# Patient Record
Sex: FEMALE | Race: OTHER | HISPANIC OR LATINO | URBAN - METROPOLITAN AREA
[De-identification: names, ages, dates, MRNs, and addresses within clinical notes are randomized per-mention and may not be internally consistent; named-entity substitution may affect disease eponyms.]

---

## 2021-12-12 ENCOUNTER — EMERGENCY (EMERGENCY)
Facility: HOSPITAL | Age: 26
LOS: 1 days | Discharge: HOME | End: 2021-12-12
Attending: EMERGENCY MEDICINE
Payer: COMMERCIAL

## 2021-12-12 VITALS
RESPIRATION RATE: 17 BRPM | DIASTOLIC BLOOD PRESSURE: 79 MMHG | HEIGHT: 57 IN | SYSTOLIC BLOOD PRESSURE: 119 MMHG | TEMPERATURE: 99 F | OXYGEN SATURATION: 98 % | WEIGHT: 125 LBS | HEART RATE: 74 BPM

## 2021-12-12 VITALS
HEART RATE: 76 BPM | TEMPERATURE: 98 F | DIASTOLIC BLOOD PRESSURE: 72 MMHG | SYSTOLIC BLOOD PRESSURE: 121 MMHG | OXYGEN SATURATION: 100 % | RESPIRATION RATE: 18 BRPM

## 2021-12-12 LAB
ALBUMIN SERPL ELPH-MCNC: 3.7 G/DL — SIGNIFICANT CHANGE UP (ref 3.5–5)
ALP SERPL-CCNC: 93 U/L — SIGNIFICANT CHANGE UP (ref 40–120)
ALT FLD-CCNC: 37 U/L DA — SIGNIFICANT CHANGE UP (ref 10–60)
ANION GAP SERPL CALC-SCNC: 6 MMOL/L — SIGNIFICANT CHANGE UP (ref 5–17)
AST SERPL-CCNC: 18 U/L — SIGNIFICANT CHANGE UP (ref 10–40)
BASOPHILS # BLD AUTO: 0.03 K/UL — SIGNIFICANT CHANGE UP (ref 0–0.2)
BASOPHILS NFR BLD AUTO: 0.4 % — SIGNIFICANT CHANGE UP (ref 0–2)
BILIRUB SERPL-MCNC: 0.4 MG/DL — SIGNIFICANT CHANGE UP (ref 0.2–1.2)
BUN SERPL-MCNC: 11 MG/DL — SIGNIFICANT CHANGE UP (ref 7–18)
CALCIUM SERPL-MCNC: 8.8 MG/DL — SIGNIFICANT CHANGE UP (ref 8.4–10.5)
CHLORIDE SERPL-SCNC: 111 MMOL/L — HIGH (ref 96–108)
CK SERPL-CCNC: 107 U/L — SIGNIFICANT CHANGE UP (ref 21–215)
CO2 SERPL-SCNC: 23 MMOL/L — SIGNIFICANT CHANGE UP (ref 22–31)
CREAT SERPL-MCNC: 0.56 MG/DL — SIGNIFICANT CHANGE UP (ref 0.5–1.3)
EOSINOPHIL # BLD AUTO: 0.04 K/UL — SIGNIFICANT CHANGE UP (ref 0–0.5)
EOSINOPHIL NFR BLD AUTO: 0.5 % — SIGNIFICANT CHANGE UP (ref 0–6)
GLUCOSE SERPL-MCNC: 96 MG/DL — SIGNIFICANT CHANGE UP (ref 70–99)
HCG SERPL-ACNC: <1 MIU/ML — SIGNIFICANT CHANGE UP
HCT VFR BLD CALC: 41.4 % — SIGNIFICANT CHANGE UP (ref 34.5–45)
HGB BLD-MCNC: 14 G/DL — SIGNIFICANT CHANGE UP (ref 11.5–15.5)
IMM GRANULOCYTES NFR BLD AUTO: 0.4 % — SIGNIFICANT CHANGE UP (ref 0–1.5)
LYMPHOCYTES # BLD AUTO: 2.21 K/UL — SIGNIFICANT CHANGE UP (ref 1–3.3)
LYMPHOCYTES # BLD AUTO: 27.3 % — SIGNIFICANT CHANGE UP (ref 13–44)
MCHC RBC-ENTMCNC: 28.9 PG — SIGNIFICANT CHANGE UP (ref 27–34)
MCHC RBC-ENTMCNC: 33.8 GM/DL — SIGNIFICANT CHANGE UP (ref 32–36)
MCV RBC AUTO: 85.5 FL — SIGNIFICANT CHANGE UP (ref 80–100)
MONOCYTES # BLD AUTO: 0.65 K/UL — SIGNIFICANT CHANGE UP (ref 0–0.9)
MONOCYTES NFR BLD AUTO: 8 % — SIGNIFICANT CHANGE UP (ref 2–14)
NEUTROPHILS # BLD AUTO: 5.15 K/UL — SIGNIFICANT CHANGE UP (ref 1.8–7.4)
NEUTROPHILS NFR BLD AUTO: 63.4 % — SIGNIFICANT CHANGE UP (ref 43–77)
NRBC # BLD: 0 /100 WBCS — SIGNIFICANT CHANGE UP (ref 0–0)
PLATELET # BLD AUTO: 247 K/UL — SIGNIFICANT CHANGE UP (ref 150–400)
POTASSIUM SERPL-MCNC: 4.3 MMOL/L — SIGNIFICANT CHANGE UP (ref 3.5–5.3)
POTASSIUM SERPL-SCNC: 4.3 MMOL/L — SIGNIFICANT CHANGE UP (ref 3.5–5.3)
PROT SERPL-MCNC: 7.4 G/DL — SIGNIFICANT CHANGE UP (ref 6–8.3)
RBC # BLD: 4.84 M/UL — SIGNIFICANT CHANGE UP (ref 3.8–5.2)
RBC # FLD: 13.1 % — SIGNIFICANT CHANGE UP (ref 10.3–14.5)
SODIUM SERPL-SCNC: 140 MMOL/L — SIGNIFICANT CHANGE UP (ref 135–145)
TROPONIN I, HIGH SENSITIVITY RESULT: 3.8 NG/L — SIGNIFICANT CHANGE UP
WBC # BLD: 8.11 K/UL — SIGNIFICANT CHANGE UP (ref 3.8–10.5)
WBC # FLD AUTO: 8.11 K/UL — SIGNIFICANT CHANGE UP (ref 3.8–10.5)

## 2021-12-12 PROCEDURE — 36415 COLL VENOUS BLD VENIPUNCTURE: CPT

## 2021-12-12 PROCEDURE — 99284 EMERGENCY DEPT VISIT MOD MDM: CPT | Mod: 25

## 2021-12-12 PROCEDURE — 84702 CHORIONIC GONADOTROPIN TEST: CPT

## 2021-12-12 PROCEDURE — 85025 COMPLETE CBC W/AUTO DIFF WBC: CPT

## 2021-12-12 PROCEDURE — 84484 ASSAY OF TROPONIN QUANT: CPT

## 2021-12-12 PROCEDURE — 80053 COMPREHEN METABOLIC PANEL: CPT

## 2021-12-12 PROCEDURE — 96361 HYDRATE IV INFUSION ADD-ON: CPT

## 2021-12-12 PROCEDURE — 99285 EMERGENCY DEPT VISIT HI MDM: CPT

## 2021-12-12 PROCEDURE — 93005 ELECTROCARDIOGRAM TRACING: CPT

## 2021-12-12 PROCEDURE — 82550 ASSAY OF CK (CPK): CPT

## 2021-12-12 PROCEDURE — 96374 THER/PROPH/DIAG INJ IV PUSH: CPT

## 2021-12-12 RX ORDER — SODIUM CHLORIDE 9 MG/ML
1000 INJECTION INTRAMUSCULAR; INTRAVENOUS; SUBCUTANEOUS ONCE
Refills: 0 | Status: COMPLETED | OUTPATIENT
Start: 2021-12-12 | End: 2021-12-12

## 2021-12-12 RX ORDER — IBUPROFEN 200 MG
1 TABLET ORAL
Qty: 30 | Refills: 0
Start: 2021-12-12

## 2021-12-12 RX ORDER — KETOROLAC TROMETHAMINE 30 MG/ML
30 SYRINGE (ML) INJECTION ONCE
Refills: 0 | Status: DISCONTINUED | OUTPATIENT
Start: 2021-12-12 | End: 2021-12-12

## 2021-12-12 RX ORDER — DIAZEPAM 5 MG
5 TABLET ORAL ONCE
Refills: 0 | Status: DISCONTINUED | OUTPATIENT
Start: 2021-12-12 | End: 2021-12-12

## 2021-12-12 RX ORDER — DIAZEPAM 5 MG
1 TABLET ORAL
Qty: 10 | Refills: 0
Start: 2021-12-12

## 2021-12-12 RX ORDER — OXYCODONE AND ACETAMINOPHEN 5; 325 MG/1; MG/1
1 TABLET ORAL ONCE
Refills: 0 | Status: DISCONTINUED | OUTPATIENT
Start: 2021-12-12 | End: 2021-12-12

## 2021-12-12 RX ADMIN — SODIUM CHLORIDE 1000 MILLILITER(S): 9 INJECTION INTRAMUSCULAR; INTRAVENOUS; SUBCUTANEOUS at 16:43

## 2021-12-12 RX ADMIN — Medication 30 MILLIGRAM(S): at 17:43

## 2021-12-12 RX ADMIN — OXYCODONE AND ACETAMINOPHEN 1 TABLET(S): 5; 325 TABLET ORAL at 19:05

## 2021-12-12 RX ADMIN — Medication 30 MILLIGRAM(S): at 16:43

## 2021-12-12 RX ADMIN — SODIUM CHLORIDE 1000 MILLILITER(S): 9 INJECTION INTRAMUSCULAR; INTRAVENOUS; SUBCUTANEOUS at 17:43

## 2021-12-12 RX ADMIN — OXYCODONE AND ACETAMINOPHEN 1 TABLET(S): 5; 325 TABLET ORAL at 19:40

## 2021-12-12 RX ADMIN — Medication 5 MILLIGRAM(S): at 16:45

## 2021-12-12 NOTE — ED ADULT NURSE NOTE - SUICIDE SCREENING QUESTION 3
A&Ox4. VSS on RA. CMS intact. Drainage from swollen RUE site this AM; site cleansed and foam dressing applied. Pain controlled w/ tylenol. CIWA score 0. Seroquel for anxiety. Nicotine patch LUE applied. Up ind. Slept well overnight. NPO since 0000. Pt to have I&D this AM. Continue to monitor   No

## 2021-12-12 NOTE — ED PROVIDER NOTE - PROGRESS NOTE DETAILS
neck pain resolved with toradol and valium. home with rx. initial plan was to do CT scan since pain did not improve, then patient fell asleep and woke up with much improvement of pain. No longer wants CT scan. home with rx neck pain resolved with toradol and valium. home with rx. initial plan was to do CT scan since pain did not initially improve, then patient fell asleep and woke up with much improvement of pain. No longer wants CT scan. home with rx

## 2021-12-12 NOTE — ED PROVIDER NOTE - PHYSICAL EXAMINATION
Cardiac: heart is regular    Respiratory: lungs are clear, chest wall tenderness to palpation  Abdominal: abdomen nontender   Musculoskeletal: right posterior lateral neck tenderness to palpation, no midline spinal tenderness to palpation, no carotid bruit, strength and sensation intact in both legs   Eyes: pupils equal, round, and reactive to light; vision normal 20/20   Neuro: speech is clear, rest of neuro is normal

## 2021-12-12 NOTE — ED PROVIDER NOTE - CLINICAL SUMMARY MEDICAL DECISION MAKING FREE TEXT BOX
Patient w/ reproducible chest tenderness to palpation, also right posterior neck pain reproducible by ranging her neck. Will do labs, pain control, reassess. Patient w/ reproducible chest wall tenderness to palpation, also right posterior neck pain reproducible by ranging her neck. Will do labs, pain control, reassess.

## 2021-12-12 NOTE — ED PROVIDER NOTE - OBJECTIVE STATEMENT
25 y/o F coming in w/ 2 days of right-sided neck pain that is now radiating to her head. Patient also had some chest pain 3 days ago. Patient took some Tylenol/Motrin w/ no relief. This morning, right-sided neck pain traveled up her scalp and patient had a little bit or transient right eye blurry vision. Then, patient became nervous and came to ER for further evaluation. Pain is worse w/ any attempted neck movement. Patient denies any other complaints. NKDA.

## 2021-12-12 NOTE — ED PROVIDER NOTE - PATIENT PORTAL LINK FT
You can access the FollowMyHealth Patient Portal offered by [facility] by registering at the following website: [email or web address]. By joining [facility]’s FollowMyHealth portal, you will also be able to view your health information using other applications (apps) compatible with our system.

## 2022-06-19 ENCOUNTER — INPATIENT (INPATIENT)
Facility: HOSPITAL | Age: 27
LOS: 2 days | Discharge: HOME | DRG: 872 | End: 2022-06-22
Attending: INTERNAL MEDICINE | Admitting: INTERNAL MEDICINE
Payer: COMMERCIAL

## 2022-06-19 VITALS
DIASTOLIC BLOOD PRESSURE: 33 MMHG | WEIGHT: 134.92 LBS | SYSTOLIC BLOOD PRESSURE: 135 MMHG | RESPIRATION RATE: 22 BRPM | HEART RATE: 125 BPM | HEIGHT: 57 IN | TEMPERATURE: 102 F | OXYGEN SATURATION: 100 %

## 2022-06-19 DIAGNOSIS — R06.00 DYSPNEA, UNSPECIFIED: ICD-10-CM

## 2022-06-19 DIAGNOSIS — R06.02 SHORTNESS OF BREATH: ICD-10-CM

## 2022-06-19 DIAGNOSIS — R07.9 CHEST PAIN, UNSPECIFIED: ICD-10-CM

## 2022-06-19 DIAGNOSIS — N12 TUBULO-INTERSTITIAL NEPHRITIS, NOT SPECIFIED AS ACUTE OR CHRONIC: ICD-10-CM

## 2022-06-19 DIAGNOSIS — A41.9 SEPSIS, UNSPECIFIED ORGANISM: ICD-10-CM

## 2022-06-19 DIAGNOSIS — Z29.9 ENCOUNTER FOR PROPHYLACTIC MEASURES, UNSPECIFIED: ICD-10-CM

## 2022-06-19 PROBLEM — Z78.9 OTHER SPECIFIED HEALTH STATUS: Chronic | Status: ACTIVE | Noted: 2021-12-20

## 2022-06-19 LAB
ALBUMIN SERPL ELPH-MCNC: 3.4 G/DL — LOW (ref 3.5–5)
ALP SERPL-CCNC: 99 U/L — SIGNIFICANT CHANGE UP (ref 40–120)
ALT FLD-CCNC: 33 U/L DA — SIGNIFICANT CHANGE UP (ref 10–60)
ANION GAP SERPL CALC-SCNC: 10 MMOL/L — SIGNIFICANT CHANGE UP (ref 5–17)
ANION GAP SERPL CALC-SCNC: 9 MMOL/L — SIGNIFICANT CHANGE UP (ref 5–17)
APPEARANCE UR: ABNORMAL
APTT BLD: 30.3 SEC — SIGNIFICANT CHANGE UP (ref 27.5–35.5)
AST SERPL-CCNC: 16 U/L — SIGNIFICANT CHANGE UP (ref 10–40)
BASOPHILS # BLD AUTO: 0.02 K/UL — SIGNIFICANT CHANGE UP (ref 0–0.2)
BASOPHILS # BLD AUTO: 0.02 K/UL — SIGNIFICANT CHANGE UP (ref 0–0.2)
BASOPHILS NFR BLD AUTO: 0.1 % — SIGNIFICANT CHANGE UP (ref 0–2)
BASOPHILS NFR BLD AUTO: 0.1 % — SIGNIFICANT CHANGE UP (ref 0–2)
BILIRUB SERPL-MCNC: 0.8 MG/DL — SIGNIFICANT CHANGE UP (ref 0.2–1.2)
BILIRUB UR-MCNC: NEGATIVE — SIGNIFICANT CHANGE UP
BUN SERPL-MCNC: 10 MG/DL — SIGNIFICANT CHANGE UP (ref 7–18)
BUN SERPL-MCNC: 9 MG/DL — SIGNIFICANT CHANGE UP (ref 7–18)
CALCIUM SERPL-MCNC: 8.6 MG/DL — SIGNIFICANT CHANGE UP (ref 8.4–10.5)
CALCIUM SERPL-MCNC: 9.2 MG/DL — SIGNIFICANT CHANGE UP (ref 8.4–10.5)
CHLORIDE SERPL-SCNC: 105 MMOL/L — SIGNIFICANT CHANGE UP (ref 96–108)
CHLORIDE SERPL-SCNC: 107 MMOL/L — SIGNIFICANT CHANGE UP (ref 96–108)
CK MB CFR SERPL CALC: <1 NG/ML — SIGNIFICANT CHANGE UP (ref 0–3.6)
CO2 SERPL-SCNC: 21 MMOL/L — LOW (ref 22–31)
CO2 SERPL-SCNC: 22 MMOL/L — SIGNIFICANT CHANGE UP (ref 22–31)
COLOR SPEC: YELLOW — SIGNIFICANT CHANGE UP
CREAT SERPL-MCNC: 0.47 MG/DL — LOW (ref 0.5–1.3)
CREAT SERPL-MCNC: 0.56 MG/DL — SIGNIFICANT CHANGE UP (ref 0.5–1.3)
D DIMER BLD IA.RAPID-MCNC: 289 NG/ML DDU — HIGH
DIFF PNL FLD: ABNORMAL
EGFR: 127 ML/MIN/1.73M2 — SIGNIFICANT CHANGE UP
EGFR: 133 ML/MIN/1.73M2 — SIGNIFICANT CHANGE UP
EOSINOPHIL # BLD AUTO: 0.01 K/UL — SIGNIFICANT CHANGE UP (ref 0–0.5)
EOSINOPHIL # BLD AUTO: 0.01 K/UL — SIGNIFICANT CHANGE UP (ref 0–0.5)
EOSINOPHIL NFR BLD AUTO: 0.1 % — SIGNIFICANT CHANGE UP (ref 0–6)
EOSINOPHIL NFR BLD AUTO: 0.1 % — SIGNIFICANT CHANGE UP (ref 0–6)
ERYTHROCYTE [SEDIMENTATION RATE] IN BLOOD: 23 MM/HR — HIGH (ref 0–15)
GLUCOSE SERPL-MCNC: 111 MG/DL — HIGH (ref 70–99)
GLUCOSE SERPL-MCNC: 118 MG/DL — HIGH (ref 70–99)
GLUCOSE UR QL: NEGATIVE — SIGNIFICANT CHANGE UP
HCG SERPL-ACNC: <1 MIU/ML — SIGNIFICANT CHANGE UP
HCT VFR BLD CALC: 37.9 % — SIGNIFICANT CHANGE UP (ref 34.5–45)
HCT VFR BLD CALC: 39.1 % — SIGNIFICANT CHANGE UP (ref 34.5–45)
HGB BLD-MCNC: 12.8 G/DL — SIGNIFICANT CHANGE UP (ref 11.5–15.5)
HGB BLD-MCNC: 13 G/DL — SIGNIFICANT CHANGE UP (ref 11.5–15.5)
HIV 1 & 2 AB SERPL IA.RAPID: SIGNIFICANT CHANGE UP
IMM GRANULOCYTES NFR BLD AUTO: 0.5 % — SIGNIFICANT CHANGE UP (ref 0–1.5)
IMM GRANULOCYTES NFR BLD AUTO: 0.5 % — SIGNIFICANT CHANGE UP (ref 0–1.5)
INR BLD: 1.18 RATIO — HIGH (ref 0.88–1.16)
KETONES UR-MCNC: NEGATIVE — SIGNIFICANT CHANGE UP
LACTATE SERPL-SCNC: 1.4 MMOL/L — SIGNIFICANT CHANGE UP (ref 0.7–2)
LEUKOCYTE ESTERASE UR-ACNC: NEGATIVE — SIGNIFICANT CHANGE UP
LYMPHOCYTES # BLD AUTO: 1.02 K/UL — SIGNIFICANT CHANGE UP (ref 1–3.3)
LYMPHOCYTES # BLD AUTO: 1.29 K/UL — SIGNIFICANT CHANGE UP (ref 1–3.3)
LYMPHOCYTES # BLD AUTO: 6.8 % — LOW (ref 13–44)
LYMPHOCYTES # BLD AUTO: 8.9 % — LOW (ref 13–44)
MAGNESIUM SERPL-MCNC: 2 MG/DL — SIGNIFICANT CHANGE UP (ref 1.6–2.6)
MCHC RBC-ENTMCNC: 29 PG — SIGNIFICANT CHANGE UP (ref 27–34)
MCHC RBC-ENTMCNC: 29.1 PG — SIGNIFICANT CHANGE UP (ref 27–34)
MCHC RBC-ENTMCNC: 33.2 GM/DL — SIGNIFICANT CHANGE UP (ref 32–36)
MCHC RBC-ENTMCNC: 33.8 GM/DL — SIGNIFICANT CHANGE UP (ref 32–36)
MCV RBC AUTO: 85.9 FL — SIGNIFICANT CHANGE UP (ref 80–100)
MCV RBC AUTO: 87.5 FL — SIGNIFICANT CHANGE UP (ref 80–100)
MONOCYTES # BLD AUTO: 1.51 K/UL — HIGH (ref 0–0.9)
MONOCYTES # BLD AUTO: 1.73 K/UL — HIGH (ref 0–0.9)
MONOCYTES NFR BLD AUTO: 10.4 % — SIGNIFICANT CHANGE UP (ref 2–14)
MONOCYTES NFR BLD AUTO: 11.6 % — SIGNIFICANT CHANGE UP (ref 2–14)
NEUTROPHILS # BLD AUTO: 11.63 K/UL — HIGH (ref 1.8–7.4)
NEUTROPHILS # BLD AUTO: 12.1 K/UL — HIGH (ref 1.8–7.4)
NEUTROPHILS NFR BLD AUTO: 80 % — HIGH (ref 43–77)
NEUTROPHILS NFR BLD AUTO: 80.9 % — HIGH (ref 43–77)
NITRITE UR-MCNC: POSITIVE
NRBC # BLD: 0 /100 WBCS — SIGNIFICANT CHANGE UP (ref 0–0)
NRBC # BLD: 0 /100 WBCS — SIGNIFICANT CHANGE UP (ref 0–0)
PH UR: 7 — SIGNIFICANT CHANGE UP (ref 5–8)
PHOSPHATE SERPL-MCNC: 1.9 MG/DL — LOW (ref 2.5–4.5)
PLATELET # BLD AUTO: 228 K/UL — SIGNIFICANT CHANGE UP (ref 150–400)
PLATELET # BLD AUTO: 232 K/UL — SIGNIFICANT CHANGE UP (ref 150–400)
POTASSIUM SERPL-MCNC: 3.3 MMOL/L — LOW (ref 3.5–5.3)
POTASSIUM SERPL-MCNC: 3.6 MMOL/L — SIGNIFICANT CHANGE UP (ref 3.5–5.3)
POTASSIUM SERPL-SCNC: 3.3 MMOL/L — LOW (ref 3.5–5.3)
POTASSIUM SERPL-SCNC: 3.6 MMOL/L — SIGNIFICANT CHANGE UP (ref 3.5–5.3)
PROT SERPL-MCNC: 7.5 G/DL — SIGNIFICANT CHANGE UP (ref 6–8.3)
PROT UR-MCNC: 30 MG/DL
PROTHROM AB SERPL-ACNC: 14.1 SEC — HIGH (ref 10.5–13.4)
RAPID RVP RESULT: SIGNIFICANT CHANGE UP
RBC # BLD: 4.41 M/UL — SIGNIFICANT CHANGE UP (ref 3.8–5.2)
RBC # BLD: 4.47 M/UL — SIGNIFICANT CHANGE UP (ref 3.8–5.2)
RBC # FLD: 13.3 % — SIGNIFICANT CHANGE UP (ref 10.3–14.5)
RBC # FLD: 13.6 % — SIGNIFICANT CHANGE UP (ref 10.3–14.5)
SARS-COV-2 RNA SPEC QL NAA+PROBE: SIGNIFICANT CHANGE UP
SODIUM SERPL-SCNC: 136 MMOL/L — SIGNIFICANT CHANGE UP (ref 135–145)
SODIUM SERPL-SCNC: 138 MMOL/L — SIGNIFICANT CHANGE UP (ref 135–145)
SP GR SPEC: 1.01 — SIGNIFICANT CHANGE UP (ref 1.01–1.02)
TROPONIN I, HIGH SENSITIVITY RESULT: 5.1 NG/L — SIGNIFICANT CHANGE UP
UROBILINOGEN FLD QL: 4
WBC # BLD: 14.53 K/UL — HIGH (ref 3.8–10.5)
WBC # BLD: 14.96 K/UL — HIGH (ref 3.8–10.5)
WBC # FLD AUTO: 14.53 K/UL — HIGH (ref 3.8–10.5)
WBC # FLD AUTO: 14.96 K/UL — HIGH (ref 3.8–10.5)

## 2022-06-19 PROCEDURE — 99285 EMERGENCY DEPT VISIT HI MDM: CPT

## 2022-06-19 PROCEDURE — 76775 US EXAM ABDO BACK WALL LIM: CPT | Mod: 26

## 2022-06-19 PROCEDURE — 74176 CT ABD & PELVIS W/O CONTRAST: CPT | Mod: 26,MA

## 2022-06-19 PROCEDURE — 71045 X-RAY EXAM CHEST 1 VIEW: CPT | Mod: 26

## 2022-06-19 RX ORDER — PIPERACILLIN AND TAZOBACTAM 4; .5 G/20ML; G/20ML
3.38 INJECTION, POWDER, LYOPHILIZED, FOR SOLUTION INTRAVENOUS ONCE
Refills: 0 | Status: COMPLETED | OUTPATIENT
Start: 2022-06-19 | End: 2022-06-19

## 2022-06-19 RX ORDER — SENNA PLUS 8.6 MG/1
2 TABLET ORAL AT BEDTIME
Refills: 0 | Status: DISCONTINUED | OUTPATIENT
Start: 2022-06-19 | End: 2022-06-22

## 2022-06-19 RX ORDER — INFLUENZA VIRUS VACCINE 15; 15; 15; 15 UG/.5ML; UG/.5ML; UG/.5ML; UG/.5ML
0.5 SUSPENSION INTRAMUSCULAR ONCE
Refills: 0 | Status: DISCONTINUED | OUTPATIENT
Start: 2022-06-19 | End: 2022-06-19

## 2022-06-19 RX ORDER — SODIUM CHLORIDE 9 MG/ML
1900 INJECTION INTRAMUSCULAR; INTRAVENOUS; SUBCUTANEOUS ONCE
Refills: 0 | Status: COMPLETED | OUTPATIENT
Start: 2022-06-19 | End: 2022-06-19

## 2022-06-19 RX ORDER — OXYCODONE AND ACETAMINOPHEN 5; 325 MG/1; MG/1
1 TABLET ORAL EVERY 6 HOURS
Refills: 0 | Status: DISCONTINUED | OUTPATIENT
Start: 2022-06-19 | End: 2022-06-21

## 2022-06-19 RX ORDER — KETOROLAC TROMETHAMINE 30 MG/ML
15 SYRINGE (ML) INJECTION ONCE
Refills: 0 | Status: DISCONTINUED | OUTPATIENT
Start: 2022-06-19 | End: 2022-06-19

## 2022-06-19 RX ORDER — LIDOCAINE 4 G/100G
1 CREAM TOPICAL DAILY
Refills: 0 | Status: DISCONTINUED | OUTPATIENT
Start: 2022-06-19 | End: 2022-06-21

## 2022-06-19 RX ORDER — ACETAMINOPHEN 500 MG
650 TABLET ORAL EVERY 6 HOURS
Refills: 0 | Status: DISCONTINUED | OUTPATIENT
Start: 2022-06-19 | End: 2022-06-22

## 2022-06-19 RX ORDER — POTASSIUM PHOSPHATE, MONOBASIC POTASSIUM PHOSPHATE, DIBASIC 236; 224 MG/ML; MG/ML
15 INJECTION, SOLUTION INTRAVENOUS ONCE
Refills: 0 | Status: COMPLETED | OUTPATIENT
Start: 2022-06-19 | End: 2022-06-19

## 2022-06-19 RX ORDER — SODIUM CHLORIDE 9 MG/ML
1000 INJECTION INTRAMUSCULAR; INTRAVENOUS; SUBCUTANEOUS
Refills: 0 | Status: DISCONTINUED | OUTPATIENT
Start: 2022-06-19 | End: 2022-06-20

## 2022-06-19 RX ORDER — ONDANSETRON 8 MG/1
4 TABLET, FILM COATED ORAL ONCE
Refills: 0 | Status: DISCONTINUED | OUTPATIENT
Start: 2022-06-19 | End: 2022-06-21

## 2022-06-19 RX ORDER — ENOXAPARIN SODIUM 100 MG/ML
40 INJECTION SUBCUTANEOUS EVERY 24 HOURS
Refills: 0 | Status: DISCONTINUED | OUTPATIENT
Start: 2022-06-19 | End: 2022-06-22

## 2022-06-19 RX ORDER — POTASSIUM CHLORIDE 20 MEQ
40 PACKET (EA) ORAL ONCE
Refills: 0 | Status: COMPLETED | OUTPATIENT
Start: 2022-06-19 | End: 2022-06-19

## 2022-06-19 RX ORDER — ENOXAPARIN SODIUM 100 MG/ML
60 INJECTION SUBCUTANEOUS ONCE
Refills: 0 | Status: DISCONTINUED | OUTPATIENT
Start: 2022-06-19 | End: 2022-06-19

## 2022-06-19 RX ORDER — PIPERACILLIN AND TAZOBACTAM 4; .5 G/20ML; G/20ML
3.38 INJECTION, POWDER, LYOPHILIZED, FOR SOLUTION INTRAVENOUS EVERY 8 HOURS
Refills: 0 | Status: DISCONTINUED | OUTPATIENT
Start: 2022-06-19 | End: 2022-06-21

## 2022-06-19 RX ORDER — MORPHINE SULFATE 50 MG/1
4 CAPSULE, EXTENDED RELEASE ORAL ONCE
Refills: 0 | Status: DISCONTINUED | OUTPATIENT
Start: 2022-06-19 | End: 2022-06-19

## 2022-06-19 RX ORDER — ACETAMINOPHEN 500 MG
650 TABLET ORAL ONCE
Refills: 0 | Status: COMPLETED | OUTPATIENT
Start: 2022-06-19 | End: 2022-06-19

## 2022-06-19 RX ADMIN — Medication 15 MILLIGRAM(S): at 22:00

## 2022-06-19 RX ADMIN — Medication 650 MILLIGRAM(S): at 19:58

## 2022-06-19 RX ADMIN — Medication 15 MILLIGRAM(S): at 21:24

## 2022-06-19 RX ADMIN — SODIUM CHLORIDE 1900 MILLILITER(S): 9 INJECTION INTRAMUSCULAR; INTRAVENOUS; SUBCUTANEOUS at 13:30

## 2022-06-19 RX ADMIN — PIPERACILLIN AND TAZOBACTAM 25 GRAM(S): 4; .5 INJECTION, POWDER, LYOPHILIZED, FOR SOLUTION INTRAVENOUS at 21:39

## 2022-06-19 RX ADMIN — MORPHINE SULFATE 4 MILLIGRAM(S): 50 CAPSULE, EXTENDED RELEASE ORAL at 13:00

## 2022-06-19 RX ADMIN — Medication 650 MILLIGRAM(S): at 20:42

## 2022-06-19 RX ADMIN — ENOXAPARIN SODIUM 40 MILLIGRAM(S): 100 INJECTION SUBCUTANEOUS at 17:24

## 2022-06-19 RX ADMIN — PIPERACILLIN AND TAZOBACTAM 3.38 GRAM(S): 4; .5 INJECTION, POWDER, LYOPHILIZED, FOR SOLUTION INTRAVENOUS at 13:30

## 2022-06-19 RX ADMIN — SODIUM CHLORIDE 1900 MILLILITER(S): 9 INJECTION INTRAMUSCULAR; INTRAVENOUS; SUBCUTANEOUS at 12:33

## 2022-06-19 RX ADMIN — Medication 650 MILLIGRAM(S): at 13:00

## 2022-06-19 RX ADMIN — Medication 40 MILLIEQUIVALENT(S): at 22:42

## 2022-06-19 RX ADMIN — MORPHINE SULFATE 4 MILLIGRAM(S): 50 CAPSULE, EXTENDED RELEASE ORAL at 12:34

## 2022-06-19 RX ADMIN — Medication 650 MILLIGRAM(S): at 12:34

## 2022-06-19 RX ADMIN — PIPERACILLIN AND TAZOBACTAM 200 GRAM(S): 4; .5 INJECTION, POWDER, LYOPHILIZED, FOR SOLUTION INTRAVENOUS at 12:34

## 2022-06-19 RX ADMIN — OXYCODONE AND ACETAMINOPHEN 1 TABLET(S): 5; 325 TABLET ORAL at 19:50

## 2022-06-19 RX ADMIN — SODIUM CHLORIDE 70 MILLILITER(S): 9 INJECTION INTRAMUSCULAR; INTRAVENOUS; SUBCUTANEOUS at 18:54

## 2022-06-19 RX ADMIN — POTASSIUM PHOSPHATE, MONOBASIC POTASSIUM PHOSPHATE, DIBASIC 62.5 MILLIMOLE(S): 236; 224 INJECTION, SOLUTION INTRAVENOUS at 22:51

## 2022-06-19 RX ADMIN — OXYCODONE AND ACETAMINOPHEN 1 TABLET(S): 5; 325 TABLET ORAL at 18:54

## 2022-06-19 RX ADMIN — SENNA PLUS 2 TABLET(S): 8.6 TABLET ORAL at 21:33

## 2022-06-19 NOTE — CHART NOTE - NSCHARTNOTEFT_GEN_A_CORE
Endorsed by admitting team to follow up on DD levels. Calculated Well's score for PE 1.5 (low risk) and DD < 289, which makes PE even less likely.  No indication to switch pt to FD AC.   Also noted pt with low K and phos levels, ordered KCl 40 mEq PO X 1 and Kphos 15 mEq IV X1.     Primary team to f/u BMP and phos levels tomorrow

## 2022-06-19 NOTE — H&P ADULT - ASSESSMENT
27 year old female with no significant PMH presents with right flank pain and SOB, admitted for sepsis likely 2/2 pyelonephritis

## 2022-06-19 NOTE — H&P ADULT - PROBLEM SELECTOR PLAN 1
patient presents with right flank pain for one week  Fever 101.7, tachy 120s, WBC 14.9, BP 95/60  UA negative for WBC, + RBC and nitrites  CXR clear   CT A/P showed No hydronephrosis or definite renal calculus. Did show Hyperdense medullary pyramids   CODE SEPSIS: s/p 2L bolus and Zosyn   Will contrinue with Zosyn  Consult ID ?  f/u blood, urine cx   c/w IVF patient presents with right flank pain for one week  Fever 101.7, tachy 120s, WBC 14.9, BP 95/60  UA negative for WBC, + RBC and nitrites  CXR clear   CT A/P showed No hydronephrosis or definite renal calculus. Did show Hyperdense medullary pyramids   CODE SEPSIS: s/p 2L bolus and Zosyn   Will continue with Zosyn  Consult ID ?  f/u blood, urine cx   c/w IVF patient presents with right flank pain for one week  Fever 101.7, tachy 120s, WBC 14.9, BP 95/60  UA negative for WBC, + RBC and nitrites  CXR clear   CT A/P showed No hydronephrosis or definite renal calculus. Did show Hyperdense medullary pyramids   CODE SEPSIS: s/p 2L bolus and Zosyn   Will continue with Zosyn  Consult ID ?  f/u blood, urine cx   f/u renal ultrasound   c/w IVF patient presents with right flank pain for one week  Fever 101.7, tachy 120s, WBC 14.9, BP 95/60  UA negative for WBC,  but has + RBC and nitrites  CXR clear   CT A/P showed No hydronephrosis or definite renal calculus. Did show Hyperdense medullary pyramids   CODE SEPSIS: s/p 2L bolus and Zosyn   DDx Pyelonephritis vs nephrolithiases   Will continue with Zosyn  Consult ID Dr. Vincent   f/u blood cx, urine cx   f/u ESR/CRP, procalcitonin   f/u renal ultrasound   c/w IVF patient presents with right flank pain for one week  Fever 101.7, tachy 120s, WBC 14.9, BP 95/60  UA negative for WBC,  but has + RBC and nitrites  CXR clear   CT A/P showed No hydronephrosis or definite renal calculus. Did show Hyperdense medullary pyramids   CODE SEPSIS: s/p 2L bolus and Zosyn   DDx Pyelonephritis vs nephrolithiases   Will continue with Zosyn  Consulted ID Dr. Vincent   f/u blood cx, urine cx   f/u ESR/CRP, procalcitonin   f/u renal ultrasound   c/w IVF

## 2022-06-19 NOTE — PATIENT PROFILE ADULT - FALL HARM RISK - UNIVERSAL INTERVENTIONS
Bed in lowest position, wheels locked, appropriate side rails in place/Call bell, personal items and telephone in reach/Instruct patient to call for assistance before getting out of bed or chair/Non-slip footwear when patient is out of bed/[place] to call system/Physically safe environment - no spills, clutter or unnecessary equipment/Purposeful Proactive Rounding/Room/bathroom lighting operational, light cord in reach

## 2022-06-19 NOTE — H&P ADULT - HISTORY OF PRESENT ILLNESS
27 year old female with no significant PMH presents with right flank pain and SOB. Patient stated that two weeks ago she started to have intermittent right flank pain that would radiate to pelvis. Patient stated the pain initially was controlled with NSAIDs, but now  pain has become unbearable. She endorses decrease appetite  nausea, vomiting fevers, but denies any dysuria, hematuria Patient states this is the first time she had this type of pain.   Also states for the past month she has been experiencing left sided chest pain that radiates to her left arm when with activity. She describes the pain as electrocuting and last a few seconds and relieves with rest. Then yesterday she reports having sudden onset of SOB, and chest discomfort, that lasted a few hours and has ow resolved. Patient denies any recent travel, unintentional weight loss.     In the ED:  101.7, HR , BP 95/100, 100% on RA  EKG Sinus tach 118  WBC 14.8, lactate WNL  UA negative for WBC, + RBC and nitrites  CT A/P showed No hydronephrosis or definite renal calculus. Did show Hyperdense medullary pyramids   CODE SEPSIS: s/p 2L bolus and Zosyn  27 year old female with no significant PMH presents with right flank pain and SOB. Patient stated that two weeks ago she started to have intermittent right flank pain that would radiate to pelvis. Patient stated the pain initially was controlled with NSAIDs, but now  pain has become unbearable. She endorses decrease appetite  nausea, vomiting fevers, but denies any dysuria, hematuria Patient states this is the first time she had this type of pain.   Also states for the past month she has been experiencing left sided chest pain that radiates to her left arm when with activity. She describes the pain as an electric feeling and last a few seconds and relieves with rest. Then yesterday she reports having sudden onset of SOB, and chest discomfort, that lasted a few hours and has ow resolved. Patient denies any recent travel, unintentional weight loss.     In the ED:  101.7, HR , BP 95/100, 100% on RA  EKG Sinus tach 118  WBC 14.8, lactate WNL  UA negative for WBC, + RBC and nitrites  CT A/P showed No hydronephrosis or definite renal calculus. Did show Hyperdense medullary pyramids   CODE SEPSIS: s/p 2L bolus and Zosyn

## 2022-06-19 NOTE — H&P ADULT - NSHPREVIEWOFSYSTEMS_GEN_ALL_CORE
REVIEW OF SYSTEMS:    CONSTITUTIONAL: + weakness, fevers or chills  EYES/ENT: No visual changes;  No vertigo or throat pain   NECK: No pain or stiffness  RESPIRATORY: No cough, wheezing, hemoptysis; + shortness of breath  CARDIOVASCULAR: No chest pain or palpitations  GASTROINTESTINAL: + abdominal no epigastric pain. + nausea, vomiting, or hematemesis; No diarrhea or constipation. No melena or hematochezia.  GENITOURINARY: No dysuria, frequency or hematuria  NEUROLOGICAL: No numbness or weakness  SKIN: No itching, burning, rashes, or lesions   All other review of systems is negative unless indicated above.

## 2022-06-19 NOTE — H&P ADULT - PROBLEM SELECTOR PLAN 2
- patient states she had sudden onset of SOB last night  - has hx of anxiety  - CXR clear  - EKG sinus tach 118  - Will get CTA r/o PE - patient states she had sudden onset of SOB last night  - has hx of anxiety  - CXR clear  - EKG sinus tach 118  - Wells Score < 2, will get d dimer  - Will get CTA r/o PE - patient states she had sudden onset of SOB last night  - has hx of anxiety  - CXR clear  - EKG sinus tach 118  - Wells Score < 2  - Will get CTA r/o PE  - will give Lovenox 60mg BID - patient states she had sudden onset of SOB last night  - has hx of anxiety  - CXR clear  - EKG sinus tach 118  - Wells Score < 2  - Will get CTA r/o PE  - will give Lovenox 60mg BID until CTA results - patient states she had sudden onset of SOB last night  - has hx of anxiety  - CXR clear  - EKG sinus tach 118  - Wells Score 4.5, moderate risk   - Will get CTA r/o PE  - will give Lovenox 60mg BID until CTA results patient presents with right flank pain for one week  Fever 101.7, tachy 120s, WBC 14.9, BP 95/60  UA negative for WBC, + RBC and nitrites  CXR clear   CT A/P showed No hydronephrosis or definite renal calculus. Did show Hyperdense medullary pyramids   CODE SEPSIS: s/p 2L bolus and Zosyn   Will continue with Zosyn  Consult ID ?  f/u blood, urine cx   f/u renal ultrasound   c/w IVF - patient states she had sudden onset of SOB last night  - has hx of anxiety  - CXR clear  - EKG sinus tach 118  - unlikely PE, as Wells Score < 2  - f/u Dimer

## 2022-06-19 NOTE — H&P ADULT - NSHPPHYSICALEXAM_GEN_ALL_CORE
PHYSICAL EXAMINATION:  GENERAL: NAD,   HEAD:  Atraumatic, Normocephalic  EYES:  conjunctiva and sclera clear  NECK: Supple, No JVD, Normal thyroid  CHEST/LUNG: Clear to auscultation. Clear to percussion bilaterally; No rales, rhonchi, wheezing, or rubs  HEART: Regular rate and rhythm; No murmurs, rubs, or gallops  ABDOMEN: right flank pain, suprapubic tenderness  NERVOUS SYSTEM:  Alert & Oriented X3,    EXTREMITIES:  2+ Peripheral Pulses, No clubbing, cyanosis, or edema  SKIN: warm dry

## 2022-06-19 NOTE — H&P ADULT - NSHPLABSRESULTS_GEN_ALL_CORE
LABS:                        13.0   14.96 )-----------( 232      ( 2022 12:31 )             39.1         136  |  105  |  10  ----------------------------<  111<H>  3.6   |  21<L>  |  0.56    Ca    9.2      2022 12:31    TPro  7.5  /  Alb  3.4<L>  /  TBili  0.8  /  DBili  x   /  AST  16  /  ALT  33  /  AlkPhos  99      PT/INR - ( 2022 12:31 )   PT: 14.1 sec;   INR: 1.18 ratio         PTT - ( 2022 12:31 )  PTT:30.3 sec  Urinalysis Basic - ( 2022 12:31 )    Color: Yellow / Appearance: Slightly Turbid / S.010 / pH: x  Gluc: x / Ketone: Negative  / Bili: Negative / Urobili: 4   Blood: x / Protein: 30 mg/dL / Nitrite: Positive   Leuk Esterase: Negative / RBC: 5-10 /HPF / WBC 0-2 /HPF   Sq Epi: x / Non Sq Epi: Moderate /HPF / Bacteria: Many /HPF      LIVER FUNCTIONS - ( 2022 12:31 )  Alb: 3.4 g/dL / Pro: 7.5 g/dL / ALK PHOS: 99 U/L / ALT: 33 U/L DA / AST: 16 U/L / GGT: x           Lactate, Blood: 1.4 mmol/L (22 @ 12:31)

## 2022-06-19 NOTE — H&P ADULT - PROBLEM SELECTOR PLAN 4
IMPROVE VTE Individual Risk Assessment          RISK                                                          Points  [  ] Previous VTE                                                3  [  ] Thrombophilia                                             2  [  ] Lower limb paralysis                                   2        (unable to hold up >15 seconds)    [  ] Current Cancer                                             2         (within 6 months)  [ x ] Immobilization > 24 hrs                              1  [  ] ICU/CCU stay > 24 hours                             1  [  ] Age > 60                                                         1    IMPROVE VTE Score:         [     1    ]  Will start Lovenox -patient states for the past two weeks as chest pain with activity, relives with rest  - describes pain as electric shocks   - takes ativan for muscle Spasms?   -unlikely ACS, likely muscle spasms  will get troponin and CKMB, dimer

## 2022-06-19 NOTE — CONSULT NOTE ADULT - ASSESSMENT
Patient is a 27y old  Female with no significant PMH presents to the  ER for evaluation of right flank pain and SOB. Patient stated that two weeks ago she started to have intermittent right flank pain that would radiate to pelvis. Patient stated the pain initially was controlled with NSAIDs, but now  pain has become unbearable. She endorses decrease appetite  nausea, vomiting  and fever. ON admission, she found to have fever, tachycardia, tachypnea, Leukocytosis and Positive UA with positive Nitrite. The CODE sepsis was called. The CT abd/pelvis shows no hydronephrosis. She has started on Zosyn and the ID consult requested to assist with further evaluation and antibiotic management.    # Sepsis ( Fever + tachycardia + Leukocytosis)- s/p code sepsis in ER  # UTI_ Positive Nitrite    would recommend:    1. Follow up Urine and Blood cultures  2. Monitor WBC count  3. Monitor kidney function and adjust Abx doses accordingly  4. IVF and pain management as needed  5. Continue Zosyn until work up is done    will follow the patient with you and make further recommendation based on the clinical course and Lab results  Thank you for the opportunity to participate in Ms. Pace's care    Attending Attestation:    Spent more than 65 minutes on total encounter, more than 50 % of the visit was spent counseling and/or coordinating care by the Attending physician.     Patient is a 27y old  Female with no significant PMH presents to the  ER for evaluation of right flank pain and SOB. Patient stated that two weeks ago she started to have intermittent right flank pain that would radiate to pelvis. Patient stated the pain initially was controlled with NSAIDs, but now  pain has become unbearable. She endorses decrease appetite  nausea, vomiting  and fever. ON admission, she found to have fever, tachycardia, tachypnea, Leukocytosis and Positive UA with positive Nitrite. The CODE sepsis was called. The CT abd/pelvis shows no hydronephrosis. She has started on Zosyn and the ID consult requested to assist with further evaluation and antibiotic management.    # Sepsis ( Fever + tachycardia + Leukocytosis)- s/p code sepsis in ER  # UTI_ Positive Nitrite  # Right pyelonephritis    would recommend:    1. Follow up Urine and Blood cultures  2. Monitor WBC count  3. Monitor kidney function and adjust Abx doses accordingly  4. IVF and pain management as needed  5. Continue Zosyn until work up is done    will follow the patient with you and make further recommendation based on the clinical course and Lab results  Thank you for the opportunity to participate in Ms. Pace's care    Attending Attestation:    Spent more than 65 minutes on total encounter, more than 50 % of the visit was spent counseling and/or coordinating care by the Attending physician.

## 2022-06-19 NOTE — H&P ADULT - PROBLEM SELECTOR PLAN 5
IMPROVE VTE Individual Risk Assessment          RISK                                                          Points  [  ] Previous VTE                                                3  [  ] Thrombophilia                                             2  [  ] Lower limb paralysis                                   2        (unable to hold up >15 seconds)    [  ] Current Cancer                                             2         (within 6 months)  [ x ] Immobilization > 24 hrs                              1  [  ] ICU/CCU stay > 24 hours                             1  [  ] Age > 60                                                         1    IMPROVE VTE Score:         [     1    ]  Will start Lovenox

## 2022-06-19 NOTE — ED PROVIDER NOTE - OBJECTIVE STATEMENT
pt with complaint of pain with deep breathing and pain on right flank for several days  today arrives with fever meeting SIRS criteria  no dysuria or frequency   no rash
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
## 2022-06-19 NOTE — CONSULT NOTE ADULT - SUBJECTIVE AND OBJECTIVE BOX
Patient is a 27y old  Female with no significant PMH presents to the  ER for evaluation of right flank pain and SOB. Patient stated that two weeks ago she started to have intermittent right flank pain that would radiate to pelvis. Patient stated the pain initially was controlled with NSAIDs, but now  pain has become unbearable. She endorses decrease appetite  nausea, vomiting  and fever. ON admission, she found to have fever, tachycardia, tachypnea, Leukocytosis and Positive UA with positive Nitrite. The CODE sepsis was called. The CT abd/pelvis shows no hydronephrosis. She has started on Zosyn and the ID consult requested to assist with further evaluation and antibiotic management.      REVIEW OF SYSTEMS: Total of twelve systems have been reviewed with patient and found to be negative unless mentioned in HPI      PAST MEDICAL & SURGICAL HISTORY:  No pertinent past medical history  No significant past surgical history      SOCIAL HISTORY  Alcohol: Does not drink  Tobacco: Does not smoke  Illicit substance use: None      FAMILY HISTORY: Non contributory to the present illness      ALLERGIES: No Known Allergies      Vital Signs Last 24 Hrs  T(C): 37.6 (2022 18:06), Max: 38.7 (2022 11:31)  T(F): 99.6 (2022 18:06), Max: 101.7 (2022 11:31)  HR: 112 (2022 18:06) (89 - 125)  BP: 107/58 (2022 18:06) (95/60 - 135/33)  BP(mean): --  RR: 18 (2022 18:06) (18 - 22)  SpO2: 100% (2022 18:06) (98% - 100%)        PHYSICAL EXAM:  GENERAL: Not in distress   CHEST/LUNG:  Not using Accessory muscles   HEART: s1 and s2 present  ABDOMEN:  Left flank tenderness  EXTREMITIES: No pedal  edema  CNS: Awake and Alert      LABS:                        13.0   14.96 )-----------( 232      ( 2022 12:31 )             39.1       -19    136  |  105  |  10  ----------------------------<  111<H>  3.6   |  21<L>  |  0.56    Ca    9.2      2022 12:31    TPro  7.5  /  Alb  3.4<L>  /  TBili  0.8  /  DBili  x   /  AST  16  /  ALT  33  /  AlkPhos  99  06-    PT/INR - ( 2022 12:31 )   PT: 14.1 sec;   INR: 1.18 ratio    PTT - ( 2022 12:31 )  PTT:30.3 sec        Urinalysis Basic - ( 2022 12:31 )  Color: Yellow / Appearance: Slightly Turbid / S.010 / pH: x  Gluc: x / Ketone: Negative  / Bili: Negative / Urobili: 4   Blood: x / Protein: 30 mg/dL / Nitrite: Positive   Leuk Esterase: Negative / RBC: 5-10 /HPF / WBC 0-2 /HPF   Sq Epi: x / Non Sq Epi: Moderate /HPF / Bacteria: Many /HPF        MEDICATIONS  (STANDING):  enoxaparin Injectable 40 milliGRAM(s) SubCutaneous every 24 hours  lidocaine   4% Patch 1 Patch Transdermal daily  piperacillin/tazobactam IVPB.. 3.375 Gram(s) IV Intermittent every 8 hours  senna 2 Tablet(s) Oral at bedtime  sodium chloride 0.9%. 1000 milliLiter(s) (70 mL/Hr) IV Continuous <Continuous>    MEDICATIONS  (PRN):  acetaminophen     Tablet .. 650 milliGRAM(s) Oral every 6 hours PRN Temp greater or equal to 38C (100.4F), Mild Pain (1 - 3)  ondansetron Injectable 4 milliGRAM(s) IV Push once PRN Nausea and/or Vomiting  oxycodone    5 mG/acetaminophen 325 mG 1 Tablet(s) Oral every 6 hours PRN Severe Pain (7 - 10)        RADIOLOGY & ADDITIONAL TESTS:    22: CT Abdomen and Pelvis No Cont (22 @ 14:43) Hepatic steatosis  No hydronephrosis or definite renal calculus. Hyperdense medullary pyramids which can be seen with medullary   nephrocalcinosis however this finding is nonspecific and can also be seen in dehydration or other processes which result in increased urinary osmolarity.      22: Xray Chest 1 View- PORTABLE-Urgent (22 @ 12:22)  No acute radiographic findings        MICROBIOLOGY DATA:    Respiratory Viral Panel with COVID-19 by [facility] (22 @ 12:33)   Rapid RVP Result: NotDetec   SARS-CoV-2: NotDetec: Rapid HIV-1/2 Antibody (22 @ 12:31)   Rapid HIV-1/2 Antibody: Nonreact:            Patient is a 27y old  Female with no significant PMH presents to the  ER for evaluation of right flank pain and SOB. Patient stated that two weeks ago she started to have intermittent right flank pain that would radiate to pelvis. Patient stated the pain initially was controlled with NSAIDs, but now  pain has become unbearable. She endorses decrease appetite  nausea, vomiting  and fever. ON admission, she found to have fever, tachycardia, tachypnea, Leukocytosis and Positive UA with positive Nitrite. The CODE sepsis was called. The CT abd/pelvis shows no hydronephrosis. She has started on Zosyn and the ID consult requested to assist with further evaluation and antibiotic management.      REVIEW OF SYSTEMS: Total of twelve systems have been reviewed with patient and found to be negative unless mentioned in HPI      PAST MEDICAL & SURGICAL HISTORY:  No pertinent past medical history  No significant past surgical history      SOCIAL HISTORY  Alcohol: Does not drink  Tobacco: Does not smoke  Illicit substance use: None      FAMILY HISTORY: Non contributory to the present illness      ALLERGIES: No Known Allergies      Vital Signs Last 24 Hrs  T(C): 37.6 (2022 18:06), Max: 38.7 (2022 11:31)  T(F): 99.6 (2022 18:06), Max: 101.7 (2022 11:31)  HR: 112 (2022 18:06) (89 - 125)  BP: 107/58 (2022 18:06) (95/60 - 135/33)  BP(mean): --  RR: 18 (2022 18:06) (18 - 22)  SpO2: 100% (2022 18:06) (98% - 100%)        PHYSICAL EXAM:  GENERAL: Not in distress   CHEST/LUNG:  Not using Accessory muscles   HEART: s1 and s2 present  ABDOMEN:  Right flank tenderness  EXTREMITIES: No pedal  edema  CNS: Awake and Alert      LABS:                        13.0   14.96 )-----------( 232      ( 2022 12:31 )             39.1       -19    136  |  105  |  10  ----------------------------<  111<H>  3.6   |  21<L>  |  0.56    Ca    9.2      2022 12:31    TPro  7.5  /  Alb  3.4<L>  /  TBili  0.8  /  DBili  x   /  AST  16  /  ALT  33  /  AlkPhos  99  06-    PT/INR - ( 2022 12:31 )   PT: 14.1 sec;   INR: 1.18 ratio    PTT - ( 2022 12:31 )  PTT:30.3 sec        Urinalysis Basic - ( 2022 12:31 )  Color: Yellow / Appearance: Slightly Turbid / S.010 / pH: x  Gluc: x / Ketone: Negative  / Bili: Negative / Urobili: 4   Blood: x / Protein: 30 mg/dL / Nitrite: Positive   Leuk Esterase: Negative / RBC: 5-10 /HPF / WBC 0-2 /HPF   Sq Epi: x / Non Sq Epi: Moderate /HPF / Bacteria: Many /HPF        MEDICATIONS  (STANDING):  enoxaparin Injectable 40 milliGRAM(s) SubCutaneous every 24 hours  lidocaine   4% Patch 1 Patch Transdermal daily  piperacillin/tazobactam IVPB.. 3.375 Gram(s) IV Intermittent every 8 hours  senna 2 Tablet(s) Oral at bedtime  sodium chloride 0.9%. 1000 milliLiter(s) (70 mL/Hr) IV Continuous <Continuous>    MEDICATIONS  (PRN):  acetaminophen     Tablet .. 650 milliGRAM(s) Oral every 6 hours PRN Temp greater or equal to 38C (100.4F), Mild Pain (1 - 3)  ondansetron Injectable 4 milliGRAM(s) IV Push once PRN Nausea and/or Vomiting  oxycodone    5 mG/acetaminophen 325 mG 1 Tablet(s) Oral every 6 hours PRN Severe Pain (7 - 10)        RADIOLOGY & ADDITIONAL TESTS:    22: CT Abdomen and Pelvis No Cont (22 @ 14:43) Hepatic steatosis  No hydronephrosis or definite renal calculus. Hyperdense medullary pyramids which can be seen with medullary   nephrocalcinosis however this finding is nonspecific and can also be seen in dehydration or other processes which result in increased urinary osmolarity.      22: Xray Chest 1 View- PORTABLE-Urgent (22 @ 12:22)  No acute radiographic findings        MICROBIOLOGY DATA:    Respiratory Viral Panel with COVID-19 by [facility] (22 @ 12:33)   Rapid RVP Result: NotDetec   SARS-CoV-2: NotDetec: Rapid HIV-1/2 Antibody (22 @ 12:31)   Rapid HIV-1/2 Antibody: Nonreact:

## 2022-06-19 NOTE — ED ADULT NURSE NOTE - OBJECTIVE STATEMENT
Pt c/o right flank pain x 1 week. No acute distress noted, denies chest pain, no shortness of breath indicated.

## 2022-06-19 NOTE — H&P ADULT - PROBLEM SELECTOR PLAN 3
-patient states for the past two weeks as chest pain with activity, relives with rest  - describes pain as electrocuting  - takes ativan for muscle Spasms?   -unlikely ACS, likley muscle spasms  will get troponin and CKMB -patient states for the past two weeks as chest pain with activity, relives with rest  - describes pain as electrocuting  - takes ativan for muscle Spasms?   -unlikely ACS, likley muscle spasms  will get troponin and CKMB, f/u CTA chest to r/o PE -patient states for the past two weeks as chest pain with activity, relives with rest  - describes pain as electrocuting  - takes ativan for muscle Spasms?   -unlikely ACS, likely muscle spasms  will get troponin and CKMB, f/u CTA chest to r/o PE -patient states for the past two weeks as chest pain with activity, relives with rest  - describes pain as electric shocks   - takes ativan for muscle Spasms?   -unlikely ACS, likely muscle spasms  will get troponin and CKMB, f/u CTA chest to r/o PE - patient states she had sudden onset of SOB last night  - has hx of anxiety  - CXR clear  - EKG sinus tach 118  - unlikely PE, as Wells Score < 2  - f/u Dimer -patient states for the past two weeks as chest pain with activity, relives with rest  - describes pain as electric shocks   - takes ativan for muscle Spasms?   -unlikely ACS, likely muscle spasms  will get troponin and CKMB, dimer

## 2022-06-20 LAB
CRP SERPL-MCNC: 125 MG/L — HIGH
PROCALCITONIN SERPL-MCNC: 0.06 NG/ML — SIGNIFICANT CHANGE UP (ref 0.02–0.1)

## 2022-06-20 RX ORDER — KETOROLAC TROMETHAMINE 30 MG/ML
15 SYRINGE (ML) INJECTION ONCE
Refills: 0 | Status: DISCONTINUED | OUTPATIENT
Start: 2022-06-20 | End: 2022-06-20

## 2022-06-20 RX ORDER — SODIUM CHLORIDE 9 MG/ML
1000 INJECTION INTRAMUSCULAR; INTRAVENOUS; SUBCUTANEOUS
Refills: 0 | Status: DISCONTINUED | OUTPATIENT
Start: 2022-06-20 | End: 2022-06-22

## 2022-06-20 RX ORDER — KETOROLAC TROMETHAMINE 30 MG/ML
15 SYRINGE (ML) INJECTION EVERY 6 HOURS
Refills: 0 | Status: DISCONTINUED | OUTPATIENT
Start: 2022-06-20 | End: 2022-06-21

## 2022-06-20 RX ADMIN — Medication 15 MILLIGRAM(S): at 06:05

## 2022-06-20 RX ADMIN — SODIUM CHLORIDE 75 MILLILITER(S): 9 INJECTION INTRAMUSCULAR; INTRAVENOUS; SUBCUTANEOUS at 22:10

## 2022-06-20 RX ADMIN — SODIUM CHLORIDE 70 MILLILITER(S): 9 INJECTION INTRAMUSCULAR; INTRAVENOUS; SUBCUTANEOUS at 09:51

## 2022-06-20 RX ADMIN — PIPERACILLIN AND TAZOBACTAM 25 GRAM(S): 4; .5 INJECTION, POWDER, LYOPHILIZED, FOR SOLUTION INTRAVENOUS at 14:30

## 2022-06-20 RX ADMIN — PIPERACILLIN AND TAZOBACTAM 25 GRAM(S): 4; .5 INJECTION, POWDER, LYOPHILIZED, FOR SOLUTION INTRAVENOUS at 05:53

## 2022-06-20 RX ADMIN — Medication 650 MILLIGRAM(S): at 12:47

## 2022-06-20 RX ADMIN — PIPERACILLIN AND TAZOBACTAM 25 GRAM(S): 4; .5 INJECTION, POWDER, LYOPHILIZED, FOR SOLUTION INTRAVENOUS at 22:09

## 2022-06-20 RX ADMIN — Medication 15 MILLIGRAM(S): at 14:27

## 2022-06-20 RX ADMIN — ENOXAPARIN SODIUM 40 MILLIGRAM(S): 100 INJECTION SUBCUTANEOUS at 17:16

## 2022-06-20 RX ADMIN — Medication 15 MILLIGRAM(S): at 22:50

## 2022-06-20 RX ADMIN — LIDOCAINE 1 PATCH: 4 CREAM TOPICAL at 11:28

## 2022-06-20 RX ADMIN — LIDOCAINE 1 PATCH: 4 CREAM TOPICAL at 22:05

## 2022-06-20 RX ADMIN — LIDOCAINE 1 PATCH: 4 CREAM TOPICAL at 19:40

## 2022-06-20 RX ADMIN — Medication 15 MILLIGRAM(S): at 06:45

## 2022-06-20 RX ADMIN — SODIUM CHLORIDE 75 MILLILITER(S): 9 INJECTION INTRAMUSCULAR; INTRAVENOUS; SUBCUTANEOUS at 14:39

## 2022-06-20 RX ADMIN — Medication 15 MILLIGRAM(S): at 22:10

## 2022-06-20 RX ADMIN — Medication 15 MILLIGRAM(S): at 15:32

## 2022-06-21 LAB
-  AMIKACIN: SIGNIFICANT CHANGE UP
-  AMOXICILLIN/CLAVULANIC ACID: SIGNIFICANT CHANGE UP
-  AMPICILLIN/SULBACTAM: SIGNIFICANT CHANGE UP
-  AMPICILLIN: SIGNIFICANT CHANGE UP
-  AZTREONAM: SIGNIFICANT CHANGE UP
-  CEFAZOLIN: SIGNIFICANT CHANGE UP
-  CEFEPIME: SIGNIFICANT CHANGE UP
-  CEFOXITIN: SIGNIFICANT CHANGE UP
-  CEFTRIAXONE: SIGNIFICANT CHANGE UP
-  CIPROFLOXACIN: SIGNIFICANT CHANGE UP
-  ERTAPENEM: SIGNIFICANT CHANGE UP
-  GENTAMICIN: SIGNIFICANT CHANGE UP
-  IMIPENEM: SIGNIFICANT CHANGE UP
-  LEVOFLOXACIN: SIGNIFICANT CHANGE UP
-  MEROPENEM: SIGNIFICANT CHANGE UP
-  NITROFURANTOIN: SIGNIFICANT CHANGE UP
-  PIPERACILLIN/TAZOBACTAM: SIGNIFICANT CHANGE UP
-  TIGECYCLINE: SIGNIFICANT CHANGE UP
-  TOBRAMYCIN: SIGNIFICANT CHANGE UP
-  TRIMETHOPRIM/SULFAMETHOXAZOLE: SIGNIFICANT CHANGE UP
ANION GAP SERPL CALC-SCNC: 9 MMOL/L — SIGNIFICANT CHANGE UP (ref 5–17)
BUN SERPL-MCNC: 4 MG/DL — LOW (ref 7–18)
CALCIUM SERPL-MCNC: 9 MG/DL — SIGNIFICANT CHANGE UP (ref 8.4–10.5)
CHLORIDE SERPL-SCNC: 110 MMOL/L — HIGH (ref 96–108)
CO2 SERPL-SCNC: 22 MMOL/L — SIGNIFICANT CHANGE UP (ref 22–31)
CREAT SERPL-MCNC: 0.44 MG/DL — LOW (ref 0.5–1.3)
CULTURE RESULTS: SIGNIFICANT CHANGE UP
EGFR: 135 ML/MIN/1.73M2 — SIGNIFICANT CHANGE UP
GLUCOSE SERPL-MCNC: 110 MG/DL — HIGH (ref 70–99)
HCT VFR BLD CALC: 35.4 % — SIGNIFICANT CHANGE UP (ref 34.5–45)
HGB BLD-MCNC: 11.9 G/DL — SIGNIFICANT CHANGE UP (ref 11.5–15.5)
MAGNESIUM SERPL-MCNC: 2.3 MG/DL — SIGNIFICANT CHANGE UP (ref 1.6–2.6)
MCHC RBC-ENTMCNC: 29 PG — SIGNIFICANT CHANGE UP (ref 27–34)
MCHC RBC-ENTMCNC: 33.6 GM/DL — SIGNIFICANT CHANGE UP (ref 32–36)
MCV RBC AUTO: 86.1 FL — SIGNIFICANT CHANGE UP (ref 80–100)
METHOD TYPE: SIGNIFICANT CHANGE UP
NRBC # BLD: 0 /100 WBCS — SIGNIFICANT CHANGE UP (ref 0–0)
ORGANISM # SPEC MICROSCOPIC CNT: SIGNIFICANT CHANGE UP
ORGANISM # SPEC MICROSCOPIC CNT: SIGNIFICANT CHANGE UP
PHOSPHATE SERPL-MCNC: 2.9 MG/DL — SIGNIFICANT CHANGE UP (ref 2.5–4.5)
PLATELET # BLD AUTO: 244 K/UL — SIGNIFICANT CHANGE UP (ref 150–400)
POTASSIUM SERPL-MCNC: 3.4 MMOL/L — LOW (ref 3.5–5.3)
POTASSIUM SERPL-SCNC: 3.4 MMOL/L — LOW (ref 3.5–5.3)
RBC # BLD: 4.11 M/UL — SIGNIFICANT CHANGE UP (ref 3.8–5.2)
RBC # FLD: 13.3 % — SIGNIFICANT CHANGE UP (ref 10.3–14.5)
SODIUM SERPL-SCNC: 141 MMOL/L — SIGNIFICANT CHANGE UP (ref 135–145)
SPECIMEN SOURCE: SIGNIFICANT CHANGE UP
WBC # BLD: 8.92 K/UL — SIGNIFICANT CHANGE UP (ref 3.8–10.5)
WBC # FLD AUTO: 8.92 K/UL — SIGNIFICANT CHANGE UP (ref 3.8–10.5)

## 2022-06-21 RX ORDER — CEFTRIAXONE 500 MG/1
INJECTION, POWDER, FOR SOLUTION INTRAMUSCULAR; INTRAVENOUS
Refills: 0 | Status: DISCONTINUED | OUTPATIENT
Start: 2022-06-21 | End: 2022-06-22

## 2022-06-21 RX ORDER — CEFTRIAXONE 500 MG/1
1000 INJECTION, POWDER, FOR SOLUTION INTRAMUSCULAR; INTRAVENOUS ONCE
Refills: 0 | Status: COMPLETED | OUTPATIENT
Start: 2022-06-21 | End: 2022-06-21

## 2022-06-21 RX ORDER — CEFTRIAXONE 500 MG/1
1000 INJECTION, POWDER, FOR SOLUTION INTRAMUSCULAR; INTRAVENOUS EVERY 24 HOURS
Refills: 0 | Status: DISCONTINUED | OUTPATIENT
Start: 2022-06-22 | End: 2022-06-22

## 2022-06-21 RX ORDER — POTASSIUM CHLORIDE 20 MEQ
40 PACKET (EA) ORAL ONCE
Refills: 0 | Status: COMPLETED | OUTPATIENT
Start: 2022-06-21 | End: 2022-06-21

## 2022-06-21 RX ADMIN — PIPERACILLIN AND TAZOBACTAM 25 GRAM(S): 4; .5 INJECTION, POWDER, LYOPHILIZED, FOR SOLUTION INTRAVENOUS at 05:56

## 2022-06-21 RX ADMIN — Medication 650 MILLIGRAM(S): at 12:55

## 2022-06-21 RX ADMIN — Medication 650 MILLIGRAM(S): at 13:37

## 2022-06-21 RX ADMIN — Medication 650 MILLIGRAM(S): at 18:57

## 2022-06-21 RX ADMIN — Medication 15 MILLIGRAM(S): at 05:00

## 2022-06-21 RX ADMIN — Medication 15 MILLIGRAM(S): at 04:19

## 2022-06-21 RX ADMIN — ENOXAPARIN SODIUM 40 MILLIGRAM(S): 100 INJECTION SUBCUTANEOUS at 17:23

## 2022-06-21 RX ADMIN — CEFTRIAXONE 100 MILLIGRAM(S): 500 INJECTION, POWDER, FOR SOLUTION INTRAMUSCULAR; INTRAVENOUS at 12:19

## 2022-06-21 RX ADMIN — Medication 40 MILLIEQUIVALENT(S): at 09:16

## 2022-06-21 NOTE — PROGRESS NOTE ADULT - SUBJECTIVE AND OBJECTIVE BOX
Patient is seen and examined at the bed side, is afebrile now. She still has Right flank pain. The Blood cultures are in process. The Urine culture grew E.coli and sensitivities is pending. 0      REVIEW OF SYSTEMS: All other review systems are negative      ALLERGIES: No Known Allergies      Vital Signs Last 24 Hrs  T(C): 37.2 (2022 16:14), Max: 38.9 (2022 19:40)  T(F): 98.9 (2022 16:14), Max: 102 (2022 19:40)  HR: 91 (2022 16:14) (81 - 97)  BP: 100/60 (2022 16:14) (94/64 - 113/73)  BP(mean): --  RR: 16 (2022 16:14) (16 - 18)  SpO2: 97% (2022 16:14) (95% - 100%)      PHYSICAL EXAM:  GENERAL: Not in distress   CHEST/LUNG:  Not using Accessory muscles   HEART: s1 and s2 present  ABDOMEN:  Right flank tenderness  EXTREMITIES: No pedal  edema  CNS: Awake and Alert      LABS: No new Labs                         12.8   14.53 )-----------( 228      ( 2022 20:59 )             37.9       06-19    138  |  107  |  9   ----------------------------<  118<H>  3.3<L>   |  22  |  0.47<L>    Ca    8.6      2022 20:58  Phos  1.9     -  Mg     2.0     06-19    TPro  7.5  /  Alb  3.4<L>  /  TBili  0.8  /  DBili  x   /  AST  16  /  ALT  33  /  AlkPhos  99  06-19    PT/INR - ( 2022 12:31 )   PT: 14.1 sec;   INR: 1.18 ratio    PTT - ( 2022 12:31 )  PTT:30.3 sec        Urinalysis Basic - ( 2022 12:31 )  Color: Yellow / Appearance: Slightly Turbid / S.010 / pH: x  Gluc: x / Ketone: Negative  / Bili: Negative / Urobili: 4   Blood: x / Protein: 30 mg/dL / Nitrite: Positive   Leuk Esterase: Negative / RBC: 5-10 /HPF / WBC 0-2 /HPF   Sq Epi: x / Non Sq Epi: Moderate /HPF / Bacteria: Many /HPF        MEDICATIONS  (STANDING):    enoxaparin Injectable 40 milliGRAM(s) SubCutaneous every 24 hours  lidocaine   4% Patch 1 Patch Transdermal daily  piperacillin/tazobactam IVPB.. 3.375 Gram(s) IV Intermittent every 8 hours  senna 2 Tablet(s) Oral at bedtime  sodium chloride 0.9%. 1000 milliLiter(s) (75 mL/Hr) IV Continuous <Continuous>      RADIOLOGY & ADDITIONAL TESTS:    22: CT Abdomen and Pelvis No Cont (22 @ 14:43) Hepatic steatosis  No hydronephrosis or definite renal calculus. Hyperdense medullary pyramids which can be seen with medullary   nephrocalcinosis however this finding is nonspecific and can also be seen in dehydration or other processes which result in increased urinary osmolarity.      22: Xray Chest 1 View- PORTABLE-Urgent (22 @ 12:22)  No acute radiographic findings        MICROBIOLOGY DATA:    Culture - Urine (22 @ 20:45)   Specimen Source: Clean Catch Clean Catch (Midstream)   Culture Results: >100,000 CFU/ml Escherichia coli     Respiratory Viral Panel with COVID-19 by AUBREE (22 @ 12:33)   Rapid RVP Result: NotDetec   SARS-CoV-2: NotDetec:     Rapid HIV-1/2 Antibody (22 @ 12:31)   Rapid HIV-1/2 Antibody: Nonreact:       
    Patient is seen and examined at the bed side, is afebrile . The Blood cultures have no growth to date and Urine Cx grew E.coli.       REVIEW OF SYSTEMS: All other review systems are negative      ALLERGIES: No Known Allergies      Vital Signs Last 24 Hrs  T(C): 37.4 (2022 15:38), Max: 37.7 (2022 20:04)  T(F): 99.4 (2022 15:38), Max: 99.8 (2022 20:04)  HR: 86 (2022 15:38) (83 - 103)  BP: 106/72 (2022 15:38) (96/55 - 118/79)  BP(mean): --  RR: 19 (2022 15:38) (16 - 20)  SpO2: 100% (2022 15:38) (95% - 100%)      PHYSICAL EXAM:  GENERAL: Not in distress   CHEST/LUNG:  Not using Accessory muscles   HEART: s1 and s2 present  ABDOMEN:  Right flank tenderness  EXTREMITIES: No pedal  edema  CNS: Awake and Alert      LABS:                        11.9   8.92  )-----------( 244      ( 2022 06:10 )             35.4                           12.8   14.53 )-----------( 228      ( 2022 20:59 )             37.9         06-21    141  |  110<H>  |  4<L>  ----------------------------<  110<H>  3.4<L>   |  22  |  0.44<L>    Ca    9.0      2022 06:10  Phos  2.9     06-21  Mg     2.3     06-21      06-19    138  |  107  |  9   ----------------------------<  118<H>  3.3<L>   |  22  |  0.47<L>    Ca    8.6      2022 20:58  Phos  1.9     06-19  Mg     2.0     06-19    TPro  7.5  /  Alb  3.4<L>  /  TBili  0.8  /  DBili  x   /  AST  16  /  ALT  33  /  AlkPhos  99      PT/INR - ( 2022 12:31 )   PT: 14.1 sec;   INR: 1.18 ratio    PTT - ( 2022 12:31 )  PTT:30.3 sec        Urinalysis Basic - ( 2022 12:31 )  Color: Yellow / Appearance: Slightly Turbid / S.010 / pH: x  Gluc: x / Ketone: Negative  / Bili: Negative / Urobili: 4   Blood: x / Protein: 30 mg/dL / Nitrite: Positive   Leuk Esterase: Negative / RBC: 5-10 /HPF / WBC 0-2 /HPF   Sq Epi: x / Non Sq Epi: Moderate /HPF / Bacteria: Many /HPF        MEDICATIONS  (STANDING):    cefTRIAXone   IVPB      enoxaparin Injectable 40 milliGRAM(s) SubCutaneous every 24 hours  senna 2 Tablet(s) Oral at bedtime  sodium chloride 0.9%. 1000 milliLiter(s) (75 mL/Hr) IV Continuous <Continuous>      RADIOLOGY & ADDITIONAL TESTS:    22: CT Abdomen and Pelvis No Cont (22 @ 14:43) Hepatic steatosis  No hydronephrosis or definite renal calculus. Hyperdense medullary pyramids which can be seen with medullary   nephrocalcinosis however this finding is nonspecific and can also be seen in dehydration or other processes which result in increased urinary osmolarity.      22: Xray Chest 1 View- PORTABLE-Urgent (22 @ 12:22)  No acute radiographic findings        MICROBIOLOGY DATA:    Culture - Blood (22 @ 22:12)   Specimen Source: .Blood Blood-Peripheral   Culture Results: No growth to date.     Culture - Blood (22 @ 22:12)   Specimen Source: .Blood Blood-Peripheral   Culture Results: No growth to date.     Culture - Urine (22 @ 20:45)   - Amikacin: S <=16   - Amoxicillin/Clavulanic Acid: S <=8/4   - Ampicillin: R >16 These ampicillin results predict results for amoxicillin   - Ampicillin/Sulbactam: I 16/8 Enterobacter, Klebsiella aerogenes, Citrobacter, and Serratia may develop resistance during prolonged therapy (3-4 days)   - Aztreonam: S <=4   - Cefazolin: S 4 (MIC_CL_COM_ENTERIC_[email or web address]) For uncomplicated UTI with K. pneumoniae, E. coli, or P. mirablis: CHANTEL <=16 is sensitive and CHANTEL >=32 is resistant. This also predicts results for oral agents cefaclor, cefdinir, cefpodoxime, cefprozil, cefuroxime axetil, cephalexin and locarbef for uncomplicated UTI. Note that some isolates may be susceptible to these agents while testing resistant to cefazolin.   - Cefepime: S <=2   - Cefoxitin: S <=8   - Ceftriaxone: S <=1 Enterobacter, Klebsiella aerogenes, Citrobacter, and Serratia may develop resistance during prolonged therapy   - Ciprofloxacin: I 0.5   - Ertapenem: S <=0.5   - Gentamicin: S <=2   - Imipenem: S <=1   - Levofloxacin: S <=0.5   - Meropenem: S <=1   - Nitrofurantoin: S <=32 Should not be used to treat pyelonephritis   - Piperacillin/Tazobactam: S <=8   - Tigecycline: S <=2   - Tobramycin: S <=2   - Trimethoprim/Sulfamethoxazole: R >2/38   Specimen Source: Clean Catch Clean Catch (Midstream)   Culture Results:   >100,000 CFU/ml Escherichia coli   Organism Identification: Escherichia coli       Culture - Urine (22 @ 20:45)   Specimen Source: Clean Catch Clean Catch (Midstream)   Culture Results: >100,000 CFU/ml Escherichia coli     Respiratory Viral Panel with COVID-19 by AUBREE (22 @ 12:33)   Rapid RVP Result: NotDetec   SARS-CoV-2: NotDetec:     Rapid HIV-1/2 Antibody (22 @ 12:31)   Rapid HIV-1/2 Antibody: Nonreact:         
    PGY-1 Progress Note discussed with attending    CHIEF COMPLAINT & BRIEF HOSPITAL COURSE:  27 year old female with no significant PMH presents with right flank pain and SOB, admitted for sepsis likely 2/2 pyelonephritis     INTERVAL HPI/OVERNIGHT EVENTS:   No new complaints noted, patient with fever overnight but pain controlled    REVIEW OF SYSTEMS:  CONSTITUTIONAL: No fever, weight loss, or fatigue  RESPIRATORY: No cough, wheezing, chills or hemoptysis; No shortness of breath  CARDIOVASCULAR: No chest pain, palpitations, dizziness, or leg swelling  GASTROINTESTINAL: No abdominal pain. No nausea, vomiting, or hematemesis; No diarrhea or constipation. No melena or hematochezia.  GENITOURINARY: No dysuria or hematuria, urinary frequency  NEUROLOGICAL: No headaches, memory loss, loss of strength, numbness, or tremors  SKIN: No itching, burning, rashes, or lesions     MEDICATIONS  (STANDING):  enoxaparin Injectable 40 milliGRAM(s) SubCutaneous every 24 hours  lidocaine   4% Patch 1 Patch Transdermal daily  piperacillin/tazobactam IVPB.. 3.375 Gram(s) IV Intermittent every 8 hours  senna 2 Tablet(s) Oral at bedtime  sodium chloride 0.9%. 1000 milliLiter(s) (70 mL/Hr) IV Continuous <Continuous>    MEDICATIONS  (PRN):  acetaminophen     Tablet .. 650 milliGRAM(s) Oral every 6 hours PRN Temp greater or equal to 38C (100.4F), Mild Pain (1 - 3)  ondansetron Injectable 4 milliGRAM(s) IV Push once PRN Nausea and/or Vomiting  oxycodone    5 mG/acetaminophen 325 mG 1 Tablet(s) Oral every 6 hours PRN Severe Pain (7 - 10)      Vital Signs Last 24 Hrs  T(C): 37.7 (20 Jun 2022 07:10), Max: 39.3 (19 Jun 2022 18:06)  T(F): 99.8 (20 Jun 2022 07:10), Max: 102.7 (19 Jun 2022 18:06)  HR: 97 (20 Jun 2022 07:10) (81 - 125)  BP: 110/49 (20 Jun 2022 07:10) (94/64 - 135/33)  BP(mean): --  RR: 17 (20 Jun 2022 07:10) (17 - 22)  SpO2: 95% (20 Jun 2022 07:10) (95% - 100%)    PHYSICAL EXAMINATION:  GENERAL: NAD,   HEAD:  Atraumatic, Normocephalic  EYES:  conjunctiva and sclera clear  NECK: Supple, No JVD, Normal thyroid  CHEST/LUNG: Clear to auscultation. Clear to percussion bilaterally; No rales, rhonchi, wheezing, or rubs  HEART: Regular rate and rhythm; No murmurs, rubs, or gallops  ABDOMEN: right flank pain, suprapubic tenderness  NERVOUS SYSTEM:  Alert & Oriented X3,    EXTREMITIES:  2+ Peripheral Pulses, No clubbing, cyanosis, or edema  SKIN: warm dry                          12.8   14.53 )-----------( 228      ( 19 Jun 2022 20:59 )             37.9     06-19    138  |  107  |  9   ----------------------------<  118<H>  3.3<L>   |  22  |  0.47<L>    Ca    8.6      19 Jun 2022 20:58  Phos  1.9     06-19  Mg     2.0     06-19    TPro  7.5  /  Alb  3.4<L>  /  TBili  0.8  /  DBili  x   /  AST  16  /  ALT  33  /  AlkPhos  99  06-19    LIVER FUNCTIONS - ( 19 Jun 2022 12:31 )  Alb: 3.4 g/dL / Pro: 7.5 g/dL / ALK PHOS: 99 U/L / ALT: 33 U/L DA / AST: 16 U/L / GGT: x           CARDIAC MARKERS ( 19 Jun 2022 17:19 )  x     / x     / x     / x     / <1.0 ng/mL      PT/INR - ( 19 Jun 2022 12:31 )   PT: 14.1 sec;   INR: 1.18 ratio         PTT - ( 19 Jun 2022 12:31 )  PTT:30.3 sec    I&O's Summary          CAPILLARY BLOOD GLUCOSE      RADIOLOGY & ADDITIONAL TESTS:                  
    PGY-1 Progress Note discussed with attending    CHIEF COMPLAINT & BRIEF HOSPITAL COURSE:  27 year old female with no significant PMH presents with right flank pain and SOB, admitted for sepsis likely 2/2 pyelonephritis     INTERVAL HPI/OVERNIGHT EVENTS:   No new complaints noted,    REVIEW OF SYSTEMS:  CONSTITUTIONAL: No fever, weight loss, or fatigue  RESPIRATORY: No cough, wheezing, chills or hemoptysis; No shortness of breath  CARDIOVASCULAR: No chest pain, palpitations, dizziness, or leg swelling  GASTROINTESTINAL: No abdominal pain. No nausea, vomiting, or hematemesis; No diarrhea or constipation. No melena or hematochezia.  GENITOURINARY: No dysuria or hematuria, urinary frequency  NEUROLOGICAL: No headaches, memory loss, loss of strength, numbness, or tremors  SKIN: No itching, burning, rashes, or lesions     MEDICATIONS  (STANDING):  cefTRIAXone   IVPB      enoxaparin Injectable 40 milliGRAM(s) SubCutaneous every 24 hours  senna 2 Tablet(s) Oral at bedtime  sodium chloride 0.9%. 1000 milliLiter(s) (75 mL/Hr) IV Continuous <Continuous>    MEDICATIONS  (PRN):  acetaminophen     Tablet .. 650 milliGRAM(s) Oral every 6 hours PRN Temp greater or equal to 38C (100.4F), Mild Pain (1 - 3)      Vital Signs Last 24 Hrs  T(C): 37.4 (21 Jun 2022 15:38), Max: 37.7 (20 Jun 2022 20:04)  T(F): 99.4 (21 Jun 2022 15:38), Max: 99.8 (20 Jun 2022 20:04)  HR: 86 (21 Jun 2022 15:38) (83 - 103)  BP: 106/72 (21 Jun 2022 15:38) (96/55 - 118/79)  BP(mean): --  RR: 19 (21 Jun 2022 15:38) (16 - 20)  SpO2: 100% (21 Jun 2022 15:38) (95% - 100%)    PHYSICAL EXAMINATION:  GENERAL: NAD,   HEAD:  Atraumatic, Normocephalic  EYES:  conjunctiva and sclera clear  NECK: Supple, No JVD, Normal thyroid  CHEST/LUNG: Clear to auscultation. Clear to percussion bilaterally; No rales, rhonchi, wheezing, or rubs  HEART: Regular rate and rhythm; No murmurs, rubs, or gallops  ABDOMEN: right flank pain, suprapubic tenderness  NERVOUS SYSTEM:  Alert & Oriented X3,    EXTREMITIES:  2+ Peripheral Pulses, No clubbing, cyanosis, or edema  SKIN: warm dry                            11.9   8.92  )-----------( 244      ( 21 Jun 2022 06:10 )             35.4     06-21    141  |  110<H>  |  4<L>  ----------------------------<  110<H>  3.4<L>   |  22  |  0.44<L>    Ca    9.0      21 Jun 2022 06:10  Phos  2.9     06-21  Mg     2.3     06-21        CARDIAC MARKERS ( 19 Jun 2022 17:19 )  x     / x     / x     / x     / <1.0 ng/mL          I&O's Summary        Culture - Blood (collected 19 Jun 2022 22:12)  Source: .Blood Blood-Peripheral  Preliminary Report (20 Jun 2022 23:01):    No growth to date.    Culture - Blood (collected 19 Jun 2022 22:12)  Source: .Blood Blood-Peripheral  Preliminary Report (20 Jun 2022 23:01):    No growth to date.    Culture - Urine (collected 19 Jun 2022 20:45)  Source: Clean Catch Clean Catch (Midstream)  Final Report (21 Jun 2022 15:15):    >100,000 CFU/ml Escherichia coli  Organism: Escherichia coli (21 Jun 2022 15:15)  Organism: Escherichia coli (21 Jun 2022 15:15)        CAPILLARY BLOOD GLUCOSE      RADIOLOGY & ADDITIONAL TESTS:

## 2022-06-21 NOTE — PROGRESS NOTE ADULT - PROBLEM SELECTOR PLAN 4
IMPROVE VTE Individual Risk Assessment          RISK                                                          Points  [  ] Previous VTE                                                3  [  ] Thrombophilia                                             2  [  ] Lower limb paralysis                                   2        (unable to hold up >15 seconds)    [  ] Current Cancer                                             2         (within 6 months)  [ x ] Immobilization > 24 hrs                              1  [  ] ICU/CCU stay > 24 hours                             1  [  ] Age > 60                                                         1    IMPROVE VTE Score:         [     1    ]  Will start Lovenox
IMPROVE VTE Individual Risk Assessment          RISK                                                          Points  [  ] Previous VTE                                                3  [  ] Thrombophilia                                             2  [  ] Lower limb paralysis                                   2        (unable to hold up >15 seconds)    [  ] Current Cancer                                             2         (within 6 months)  [ x ] Immobilization > 24 hrs                              1  [  ] ICU/CCU stay > 24 hours                             1  [  ] Age > 60                                                         1    IMPROVE VTE Score:         [     1    ]  Will start Lovenox

## 2022-06-21 NOTE — PROGRESS NOTE ADULT - ATTENDING COMMENTS
discussed management plan with house staff  abx  f/u cx  ID f/u
discussed management plan with house staff  cont abx , f/u cx

## 2022-06-21 NOTE — PROGRESS NOTE ADULT - PROBLEM SELECTOR PLAN 3
-patient states for the past two weeks as chest pain with activity, relives with rest  - describes pain as electric shocks   - takes ativan for muscle Spasms?   -unlikely ACS, likely muscle spasms    ACS ruled out
-patient states for the past two weeks as chest pain with activity, relives with rest  - describes pain as electric shocks   - takes ativan for muscle Spasms?   -unlikely ACS, likely muscle spasms    ACS ruled out

## 2022-06-21 NOTE — PROGRESS NOTE ADULT - PROBLEM SELECTOR PLAN 1
patient presents with right flank pain for one week  Fever 101.7, tachy 120s, WBC 14.9, BP 95/60  UA negative for WBC,  but has + RBC and nitrites  CXR clear   CT A/P showed No hydronephrosis or definite renal calculus. Did show Hyperdense medullary pyramids   CODE SEPSIS: s/p 2L bolus and Zosyn   DDx Pyelonephritis vs nephrolithiases   Zosyn d/c'd, will start ceftriaxone today  Consulted ID Dr. Vincent   f/u blood cx, urine cx pending    ESR 23 
patient presents with right flank pain for one week  Fever 101.7, tachy 120s, WBC 14.9, BP 95/60  UA negative for WBC,  but has + RBC and nitrites  CXR clear   CT A/P showed No hydronephrosis or definite renal calculus. Did show Hyperdense medullary pyramids   CODE SEPSIS: s/p 2L bolus and Zosyn   DDx Pyelonephritis vs nephrolithiases   Will continue with Zosyn  Consulted ID Dr. Vincent   f/u blood cx, urine cx pending    ESR 23

## 2022-06-21 NOTE — PROGRESS NOTE ADULT - PROBLEM SELECTOR PLAN 2
- patient states she had sudden onset of SOB last night  - has hx of anxiety  - CXR clear  - EKG sinus tach 118  - unlikely PE, as Wells Score < 2  D dimer 289
- patient states she had sudden onset of SOB last night  - has hx of anxiety  - CXR clear  - EKG sinus tach 118  - unlikely PE, as Wells Score < 2  D dimer 289

## 2022-06-21 NOTE — PROGRESS NOTE ADULT - ASSESSMENT
27 year old female with no significant PMH presents with right flank pain and SOB, admitted for sepsis likely 2/2 pyelonephritis 
Patient is a 27y old  Female with no significant PMH presents to the  ER for evaluation of right flank pain and SOB. Patient stated that two weeks ago she started to have intermittent right flank pain that would radiate to pelvis. Patient stated the pain initially was controlled with NSAIDs, but now  pain has become unbearable. She endorses decrease appetite  nausea, vomiting  and fever. ON admission, she found to have fever, tachycardia, tachypnea, Leukocytosis and Positive UA with positive Nitrite. The CODE sepsis was called. The CT abd/pelvis shows no hydronephrosis. She has started on Zosyn and the ID consult requested to assist with further evaluation and antibiotic management.    # Sepsis ( Fever + tachycardia + Leukocytosis)- s/p code sepsis in ER  # UTI_ Positive Nitrite  # Right pyelonephritis    would recommend:    1.  Follow up Final Urine culture and Blood cultures, are in process   2. Monitor WBC count  3. Monitor kidney function and adjust Abx doses accordingly  4. IVF and pain management as needed  5. Continue Zosyn until work up is done    Attending Attestation:    Spent more than 45 minutes on total encounter, more than 50 % of the visit was spent counseling and/or coordinating care by the Attending physician.
Patient is a 27y old  Female with no significant PMH presents to the  ER for evaluation of right flank pain and SOB. Patient stated that two weeks ago she started to have intermittent right flank pain that would radiate to pelvis. Patient stated the pain initially was controlled with NSAIDs, but now  pain has become unbearable. She endorses decrease appetite  nausea, vomiting  and fever. ON admission, she found to have fever, tachycardia, tachypnea, Leukocytosis and Positive UA with positive Nitrite. The CODE sepsis was called. The CT abd/pelvis shows no hydronephrosis. She has started on Zosyn and the ID consult requested to assist with further evaluation and antibiotic management.    # Sepsis ( Fever + tachycardia + Leukocytosis)- s/p code sepsis in ER- resolved   # UTI_ Positive Nitrite  - Urine Cx grew E.coli  # Right pyelonephritis    would recommend:    1. IVF and pain management as needed  2. Continue Ceftriaxone inpatient   3. Monitor kidney function   4. May change to oral  Levaquin 750 mg daily IF QTc is less than 500.  on discharge until 6/26/22  5. OOB to chair     Attending Attestation:    Spent more than 45 minutes on total encounter, more than 50 % of the visit was spent counseling and/or coordinating care by the Attending physician.
27 year old female with no significant PMH presents with right flank pain and SOB, admitted for sepsis likely 2/2 pyelonephritis

## 2022-06-22 VITALS
OXYGEN SATURATION: 98 % | DIASTOLIC BLOOD PRESSURE: 59 MMHG | HEART RATE: 70 BPM | RESPIRATION RATE: 17 BRPM | SYSTOLIC BLOOD PRESSURE: 104 MMHG | TEMPERATURE: 98 F

## 2022-06-22 LAB
ANION GAP SERPL CALC-SCNC: 7 MMOL/L — SIGNIFICANT CHANGE UP (ref 5–17)
BUN SERPL-MCNC: 7 MG/DL — SIGNIFICANT CHANGE UP (ref 7–18)
CALCIUM SERPL-MCNC: 9.2 MG/DL — SIGNIFICANT CHANGE UP (ref 8.4–10.5)
CHLORIDE SERPL-SCNC: 109 MMOL/L — HIGH (ref 96–108)
CO2 SERPL-SCNC: 24 MMOL/L — SIGNIFICANT CHANGE UP (ref 22–31)
CREAT SERPL-MCNC: 0.46 MG/DL — LOW (ref 0.5–1.3)
EGFR: 134 ML/MIN/1.73M2 — SIGNIFICANT CHANGE UP
GLUCOSE SERPL-MCNC: 96 MG/DL — SIGNIFICANT CHANGE UP (ref 70–99)
HCT VFR BLD CALC: 36.6 % — SIGNIFICANT CHANGE UP (ref 34.5–45)
HGB BLD-MCNC: 12.1 G/DL — SIGNIFICANT CHANGE UP (ref 11.5–15.5)
MAGNESIUM SERPL-MCNC: 2.5 MG/DL — SIGNIFICANT CHANGE UP (ref 1.6–2.6)
MCHC RBC-ENTMCNC: 28.9 PG — SIGNIFICANT CHANGE UP (ref 27–34)
MCHC RBC-ENTMCNC: 33.1 GM/DL — SIGNIFICANT CHANGE UP (ref 32–36)
MCV RBC AUTO: 87.4 FL — SIGNIFICANT CHANGE UP (ref 80–100)
NRBC # BLD: 0 /100 WBCS — SIGNIFICANT CHANGE UP (ref 0–0)
PHOSPHATE SERPL-MCNC: 4 MG/DL — SIGNIFICANT CHANGE UP (ref 2.5–4.5)
PLATELET # BLD AUTO: 273 K/UL — SIGNIFICANT CHANGE UP (ref 150–400)
POTASSIUM SERPL-MCNC: 3.9 MMOL/L — SIGNIFICANT CHANGE UP (ref 3.5–5.3)
POTASSIUM SERPL-SCNC: 3.9 MMOL/L — SIGNIFICANT CHANGE UP (ref 3.5–5.3)
RBC # BLD: 4.19 M/UL — SIGNIFICANT CHANGE UP (ref 3.8–5.2)
RBC # FLD: 13.5 % — SIGNIFICANT CHANGE UP (ref 10.3–14.5)
SODIUM SERPL-SCNC: 140 MMOL/L — SIGNIFICANT CHANGE UP (ref 135–145)
WBC # BLD: 7.11 K/UL — SIGNIFICANT CHANGE UP (ref 3.8–10.5)
WBC # FLD AUTO: 7.11 K/UL — SIGNIFICANT CHANGE UP (ref 3.8–10.5)

## 2022-06-22 RX ORDER — CIPROFLOXACIN LACTATE 400MG/40ML
1 VIAL (ML) INTRAVENOUS
Qty: 5 | Refills: 0
Start: 2022-06-22 | End: 2022-06-26

## 2022-06-22 RX ADMIN — Medication 650 MILLIGRAM(S): at 06:22

## 2022-06-22 NOTE — DISCHARGE NOTE PROVIDER - HOSPITAL COURSE
27 year old female with no significant PMH presents with right flank pain and SOB, admitted for sepsis likely 2/2 pyelonephritis. CT shows perinephric standing, UA not consistent with UTI however urine culture shows E coli > [id number] sensitive to levofloxacin. Patient was started on IV zosyn > ceftriaxone and switched to levofloxacin upon discharge for a total abx course of 7 days. Patient was deemed medically stable for discharge by primary medical team.

## 2022-06-22 NOTE — DISCHARGE NOTE NURSING/CASE MANAGEMENT/SOCIAL WORK - PATIENT PORTAL LINK FT
You can access the FollowMyHealth Patient Portal offered by [facility] by registering at the following website: [email or web address]. By joining [employer]’s FollowMyHealth portal, you will also be able to view your health information using other applications (apps) compatible with our system.

## 2022-06-22 NOTE — DISCHARGE NOTE PROVIDER - NSDCCPCAREPLAN_GEN_ALL_CORE_FT
PRINCIPAL DISCHARGE DIAGNOSIS  Diagnosis: Pyelonephritis  Assessment and Plan of Treatment: You were admitted to the hospital with pyelonephritis, infection of your kidney which was found on your CT scan. You were started on antibiotics IV and are recommended to continue taking antibiotics: levofloxacin 750 mg one tablet once a day for 5 days from today until 6/26/2022. Follow up with your primary care provider within 2 weeks of hospital discharge.

## 2022-06-22 NOTE — DISCHARGE NOTE PROVIDER - NSDCMRMEDTOKEN_GEN_ALL_CORE_FT
diazepam 5 mg oral tablet: 1 tab(s) orally 2 times a day, As Needed - for muscle spasm      JOSE ELIAS:[id number] MDD:2  levoFLOXacin 750 mg oral tablet: 1 tab(s) orally once a day    levoFLOXacin 750 mg oral tablet: 1 tab(s) orally once a day

## 2022-06-22 NOTE — DISCHARGE NOTE NURSING/CASE MANAGEMENT/SOCIAL WORK - NSDCPEFALRISK_GEN_ALL_CORE
For information on Fall & Injury Prevention, visit: [email or web address] OR  [email or web address] OR  https://www.cdc.gov/steadi/patient.html

## 2022-06-24 LAB
CULTURE RESULTS: SIGNIFICANT CHANGE UP
CULTURE RESULTS: SIGNIFICANT CHANGE UP
SPECIMEN SOURCE: SIGNIFICANT CHANGE UP
SPECIMEN SOURCE: SIGNIFICANT CHANGE UP

## 2022-08-11 PROCEDURE — 81001 URINALYSIS AUTO W/SCOPE: CPT

## 2022-08-11 PROCEDURE — 85652 RBC SED RATE AUTOMATED: CPT

## 2022-08-11 PROCEDURE — 96375 TX/PRO/DX INJ NEW DRUG ADDON: CPT

## 2022-08-11 PROCEDURE — 84100 ASSAY OF PHOSPHORUS: CPT

## 2022-08-11 PROCEDURE — 80053 COMPREHEN METABOLIC PANEL: CPT

## 2022-08-11 PROCEDURE — 85379 FIBRIN DEGRADATION QUANT: CPT

## 2022-08-11 PROCEDURE — 87086 URINE CULTURE/COLONY COUNT: CPT

## 2022-08-11 PROCEDURE — 71045 X-RAY EXAM CHEST 1 VIEW: CPT

## 2022-08-11 PROCEDURE — 84484 ASSAY OF TROPONIN QUANT: CPT

## 2022-08-11 PROCEDURE — 93005 ELECTROCARDIOGRAM TRACING: CPT

## 2022-08-11 PROCEDURE — 85610 PROTHROMBIN TIME: CPT

## 2022-08-11 PROCEDURE — 74176 CT ABD & PELVIS W/O CONTRAST: CPT | Mod: MA

## 2022-08-11 PROCEDURE — 84145 PROCALCITONIN (PCT): CPT

## 2022-08-11 PROCEDURE — 87186 SC STD MICRODIL/AGAR DIL: CPT

## 2022-08-11 PROCEDURE — 85025 COMPLETE CBC W/AUTO DIFF WBC: CPT

## 2022-08-11 PROCEDURE — 76775 US EXAM ABDO BACK WALL LIM: CPT

## 2022-08-11 PROCEDURE — 84702 CHORIONIC GONADOTROPIN TEST: CPT

## 2022-08-11 PROCEDURE — 83735 ASSAY OF MAGNESIUM: CPT

## 2022-08-11 PROCEDURE — 86140 C-REACTIVE PROTEIN: CPT

## 2022-08-11 PROCEDURE — 96365 THER/PROPH/DIAG IV INF INIT: CPT

## 2022-08-11 PROCEDURE — 80048 BASIC METABOLIC PNL TOTAL CA: CPT

## 2022-08-11 PROCEDURE — 36415 COLL VENOUS BLD VENIPUNCTURE: CPT

## 2022-08-11 PROCEDURE — 0225U NFCT DS DNA&RNA 21 SARSCOV2: CPT

## 2022-08-11 PROCEDURE — 83605 ASSAY OF LACTIC ACID: CPT

## 2022-08-11 PROCEDURE — 87040 BLOOD CULTURE FOR BACTERIA: CPT

## 2022-08-11 PROCEDURE — 85730 THROMBOPLASTIN TIME PARTIAL: CPT

## 2022-08-11 PROCEDURE — 85027 COMPLETE CBC AUTOMATED: CPT

## 2022-08-11 PROCEDURE — 82553 CREATINE MB FRACTION: CPT

## 2022-08-11 PROCEDURE — 99285 EMERGENCY DEPT VISIT HI MDM: CPT | Mod: 25

## 2022-08-11 PROCEDURE — 86703 HIV-1/HIV-2 1 RESULT ANTBDY: CPT

## 2023-03-20 NOTE — ED ADULT NURSE NOTE - NS ED NURSE RECORD ANOTHER VITAL SIGN
Detail Level: Zone Shampoo Recommendations: Neutrogena t sal shampoo Topical Steroid Recommendations: Fluocinonide Detail Level: Detailed Yes

## 2023-03-27 NOTE — ED PROVIDER NOTE - WET READ LAUNCH FT
Fecal calpro WNL at less than 27. Patient's inflixmab level above target at greater than 40 and no antibody detected.     Will discuss results with MD.    There are no Wet Read(s) to document.
